# Patient Record
Sex: FEMALE | Employment: UNEMPLOYED | URBAN - METROPOLITAN AREA
[De-identification: names, ages, dates, MRNs, and addresses within clinical notes are randomized per-mention and may not be internally consistent; named-entity substitution may affect disease eponyms.]

---

## 2017-06-03 LAB
HBV SURFACE AG SERPL QL IA: NEGATIVE
HIV 1+2 AB+HIV1 P24 AG SERPL QL IA: NONREACTIVE
RUBELLA ANTIBODY IGG QUANTITATIVE: NORMAL IU/ML
T PALLIDUM IGG SER QL: NON REACTIVE

## 2017-06-06 ENCOUNTER — TRANSFERRED RECORDS (OUTPATIENT)
Dept: HEALTH INFORMATION MANAGEMENT | Facility: CLINIC | Age: 37
End: 2017-06-06

## 2017-07-17 RX ORDER — SODIUM CHLORIDE, SODIUM LACTATE, POTASSIUM CHLORIDE, CALCIUM CHLORIDE 600; 310; 30; 20 MG/100ML; MG/100ML; MG/100ML; MG/100ML
INJECTION, SOLUTION INTRAVENOUS CONTINUOUS
Status: CANCELLED | OUTPATIENT
Start: 2017-07-17

## 2017-07-17 RX ORDER — CITRIC ACID/SODIUM CITRATE 334-500MG
30 SOLUTION, ORAL ORAL
Status: CANCELLED | OUTPATIENT
Start: 2017-07-17

## 2017-07-17 RX ORDER — CEFAZOLIN SODIUM 1 G/3ML
1 INJECTION, POWDER, FOR SOLUTION INTRAMUSCULAR; INTRAVENOUS
Status: CANCELLED | OUTPATIENT
Start: 2017-07-17

## 2017-07-17 RX ORDER — CEFAZOLIN SODIUM 2 G/100ML
2 INJECTION, SOLUTION INTRAVENOUS
Status: CANCELLED | OUTPATIENT
Start: 2017-07-17

## 2017-11-22 LAB — GROUP B STREP PCR: NEGATIVE

## 2017-11-26 ENCOUNTER — HOSPITAL ENCOUNTER (OUTPATIENT)
Facility: CLINIC | Age: 37
LOS: 1 days | Discharge: HOME | End: 2017-11-26
Attending: OBSTETRICS & GYNECOLOGY | Admitting: OBSTETRICS & GYNECOLOGY
Payer: COMMERCIAL

## 2017-11-26 VITALS — RESPIRATION RATE: 16 BRPM | TEMPERATURE: 97.7 F | DIASTOLIC BLOOD PRESSURE: 66 MMHG | SYSTOLIC BLOOD PRESSURE: 103 MMHG

## 2017-11-26 PROBLEM — Z36.89 ENCOUNTER FOR TRIAGE IN PREGNANT PATIENT: Status: ACTIVE | Noted: 2017-11-26

## 2017-11-26 LAB
ALBUMIN UR-MCNC: 30 MG/DL
APPEARANCE UR: ABNORMAL
BILIRUB UR QL STRIP: NEGATIVE
COLOR UR AUTO: ABNORMAL
GLUCOSE BLDC GLUCOMTR-MCNC: 92 MG/DL (ref 70–99)
GLUCOSE UR STRIP-MCNC: NEGATIVE MG/DL
HGB UR QL STRIP: NEGATIVE
KETONES UR STRIP-MCNC: 5 MG/DL
LEUKOCYTE ESTERASE UR QL STRIP: NEGATIVE
MUCOUS THREADS #/AREA URNS LPF: PRESENT /LPF
NITRATE UR QL: NEGATIVE
PH UR STRIP: 5 PH (ref 5–7)
RBC #/AREA URNS AUTO: 2 /HPF (ref 0–2)
SOURCE: ABNORMAL
SP GR UR STRIP: 1.03 (ref 1–1.03)
SQUAMOUS #/AREA URNS AUTO: 1 /HPF (ref 0–1)
UROBILINOGEN UR STRIP-MCNC: 0 MG/DL (ref 0–2)
WBC #/AREA URNS AUTO: 1 /HPF (ref 0–2)

## 2017-11-26 PROCEDURE — 82962 GLUCOSE BLOOD TEST: CPT

## 2017-11-26 PROCEDURE — 96365 THER/PROPH/DIAG IV INF INIT: CPT

## 2017-11-26 PROCEDURE — 25000128 H RX IP 250 OP 636: Performed by: OBSTETRICS & GYNECOLOGY

## 2017-11-26 PROCEDURE — 96374 THER/PROPH/DIAG INJ IV PUSH: CPT

## 2017-11-26 PROCEDURE — 81001 URINALYSIS AUTO W/SCOPE: CPT | Performed by: OBSTETRICS & GYNECOLOGY

## 2017-11-26 PROCEDURE — 96361 HYDRATE IV INFUSION ADD-ON: CPT

## 2017-11-26 PROCEDURE — 99214 OFFICE O/P EST MOD 30 MIN: CPT

## 2017-11-26 RX ORDER — ONDANSETRON 2 MG/ML
4 INJECTION INTRAMUSCULAR; INTRAVENOUS EVERY 6 HOURS PRN
Status: DISCONTINUED | OUTPATIENT
Start: 2017-11-26 | End: 2017-11-27 | Stop reason: HOSPADM

## 2017-11-26 RX ADMIN — SODIUM CHLORIDE, POTASSIUM CHLORIDE, SODIUM LACTATE AND CALCIUM CHLORIDE 1000 ML: 600; 310; 30; 20 INJECTION, SOLUTION INTRAVENOUS at 21:02

## 2017-11-26 RX ADMIN — ONDANSETRON 4 MG: 2 INJECTION INTRAMUSCULAR; INTRAVENOUS at 21:06

## 2017-11-26 NOTE — IP AVS SNAPSHOT
[facility] Labor and Delivery    [address]    [facility] [address]    Phone:  [phone]    Fax:  [phone]                                       After Visit Summary   11/26/2017    Maggy Adams    MRN: [id number]           After Visit Summary Signature Page     I have received my discharge instructions, and my questions have been answered. I have discussed any challenges I see with this plan with the nurse or doctor.    ..........................................................................................................................................  Patient/Patient Representative Signature      ..........................................................................................................................................  Patient Representative Print Name and Relationship to Patient    ..................................................               ................................................  Date                                            Time    ..........................................................................................................................................  Reviewed by Signature/Title    ...................................................              ..............................................  Date                                                            Time

## 2017-11-26 NOTE — IP AVS SNAPSHOT
MRN:[id number]                      After Visit Summary   11/26/2017    Maggy Adams    MRN: [id number]           Thank you!     Thank you for choosing [facility] for your care. Our goal is always to provide you with excellent care. Hearing back from our patients is one way we can continue to improve our services. Please take a few minutes to complete the written survey that you may receive in the mail after you visit. If you would like to speak to someone directly about your visit please contact Patient Relations at [phone]. Thank you!          Patient Information     Date Of Birth          1980        About your hospital stay     You were admitted on:  November 26, 2017 You last received care in the:  [facility]    You were discharged on:  November 26, 2017       Who to Call     For medical emergencies, please call 911.  For non-urgent questions about your medical care, please call your primary care provider or clinic, None          Attending Provider     Provider Specialty    Cici Escobar MD OB/Gyn       Primary Care Provider    None Specified      Your next 10 appointments already scheduled     Dec 19, 2017   Procedure with Tonya Navarro MD   [facility] Delivery (--)    [address] [place] [id number] [phone]              Further instructions from your care team       Discharge Instruction for Undelivered Patients      You were seen for: nausea and vomiting  We Consulted: Dr Escobar  You had (Test or Medicine):fetal assessment, IV fluids and zofran for nausea     Diet:   Drink 8 to 12 glasses of liquids (milk, juice, water) every day.  You may eat meals and snacks.  To manager your diabetes, follow the guidelines for eating and drinking given to you by your Clinic Provider or Diabetes Educator.       Activity:  Count fetal kicks everyday (see handout)  Call your doctor or nurse midwife if your baby is moving  "less than usual.     Call your provider if you notice:  Swelling in your face or increased swelling in your hands or legs.  Headaches that are not relieved by Tylenol (acetaminophen).  Changes in your vision (blurring: seeing spots or stars.)  Nausea (sick to your stomach) and vomiting (throwing up).   Weight gain of 5 pounds or more per week.  Heartburn that doesn't go away.  Signs of bladder infection: pain when you urinate (use the toilet), need to go more often and more urgently.  The bag of puente (rupture of membranes) breaks, or you notice leaking in your underwear.  Bright red blood in your underwear.  Abdominal (lower belly) or stomach pain.  For first baby: Contractions (tightening) less than 5 minutes apart for one hour or more.  Second (plus) baby: Contractions (tightening) less than 10 minutes apart and getting stronger.  Increase or change in vaginal discharge (note the color and amount)    Follow-up:  As scheduled in the clinic          Pending Results     No orders found from 2017 to 2017.            Admission Information     Date & Time Provider Department Dept. Phone    2017 Cici Escobar MD [facility] Labor and Delivery [phone]      Your Vitals Were     Blood Pressure Temperature Respirations             103/66 97.7  F (36.5  C) (Oral) 16         MyChart Information     [employer] lets you send messages to your doctor, view your test results, renew your prescriptions, schedule appointments and more. To sign up, go to [email or web address] . Click on \"Log in\" on the left side of the screen, which will take you to the Welcome page. Then click on \"Sign up Now\" on the right side of the page.     You will be asked to enter the access code listed below, as well as some personal information. Please follow the directions to create your username and password.     Your access code is: [email or web address]  Expires: 2018 10:07 PM     Your access code will  in 90 days. If you " need help or a new code, please call your [facility] clinic or [phone].        Care EveryWhere ID     This is your Care EveryWhere ID. This could be used by other organizations to access your [facility] medical records  [id number]        Equal Access to Services     OCHOA PATEL : Hadii aad ku hadmelajm Soraquelali, waaxda luqadaha, qaybta kaalmada adejohanayada, rosmery ramilain hayaadann sánchezjohana gomez ira blanc. So [facility] [phone].    ATENCIÓN: Si habla español, tiene a siegel disposición servicios gratuitos de asistencia lingüística. Llame al [phone].    We comply with applicable federal civil rights laws and [place] laws. We do not discriminate on the basis of race, color, national origin, age, disability, sex, sexual orientation, or gender identity.               Review of your medicines      UNREVIEWED medicines. Ask your doctor about these medicines        Dose / Directions    insulin  UNIT/ML injection   Commonly known as:  HumuLIN N/NovoLIN N   Indication:  gestational diabetes        Dose:  12 Units   Inject 12 Units Subcutaneous At Bedtime   Refills:  0                Protect others around you: Learn how to safely use, store and throw away your medicines at www.disposemymeds.org.             Medication List: This is a list of all your medications and when to take them. Check marks below indicate your daily home schedule. Keep this list as a reference.      Medications           Morning Afternoon Evening Bedtime As Needed    insulin  UNIT/ML injection   Commonly known as:  HumuLIN N/NovoLIN N   Inject 12 Units Subcutaneous At Bedtime                                          More Information        Kick Counts  It s normal to worry about your baby s health. One way you can know your baby s doing well is to record the baby s movements once a day. This is called a kick count. You will usually feel your baby move by the 20th week of pregnancy. Remember to take your kick count records to all your  appointments with your healthcare provider.       How to Count Kicks    Choose a time when the baby is active, such as after a meal.     Sit comfortably or lie on your side.     The first time the baby moves, write down the time.     Count each movement until the baby has moved 10 times. This can take from 20 minutes to 2 hours.     If the baby hasn t moved 4 times in 1 hour, pat your stomach to wake the baby up.    Write down the time you feel the baby s 10th movement.    Try to do it at the same time each day.  When to Call Your Healthcare Provider  Call your healthcare provider right away if you notice any of the following:    Your baby moves fewer than 10 times in 2 hours while you re doing kick counts.    Your baby moves much less often than on the days before.    You have not felt your baby move all day.    7037-3840 [employer]. [address] [place] [address], [place] [address]. All rights reserved. This information is not intended as a substitute for professional medical care. Always follow your healthcare professional's instructions.  This information has been modified by your health care provider with permission from the publisher.

## 2017-11-27 NOTE — PLAN OF CARE
"Patient able to tolerate wallace crackers and water and states she feels \"much better.\"  Plan to discharge home with instructions per plan with Dr Escobar.  "

## 2017-11-27 NOTE — PLAN OF CARE
Data: [facility].   Reason for maternal/fetal assessment per patient is Nausea  . Patient is a . Prenatal record reviewed.      Obstetric History       T0      L2     SAB0   TAB0   Ectopic0   Multiple0   Live Births2       # Outcome Date GA Lbr Saul/2nd Weight Sex Delivery Anes PTL Lv   3 Current            2  05/29/15        ALEXX   1  13        ALEXX         Medical History:   Past Medical History:   Diagnosis Date     Diabetes (H)     gestational diabetic   . Gestational Age 36w5d. VSS. Cervix: not examined.  Fetal movement present. Patient denies backache, vaginal discharge, pelvic pressure, UTI symptoms, GI problems, bloody show, vaginal bleeding, edema, headache, visual disturbances, epigastric or URQ pain, abdominal pain, rupture of membranes. Support persons  andrea present.  Action: Verbal consent for EFM. Triage assessment completed. EFM applied for fetal assessment. Uterine assessment occasional cramping that has been present for multiple weeks. Fetal assessment: Presumed adequate fetal oxygenation documented (see flow record). Patient education pamphlets given on fetal movement counts. Patient instructed to report change in fetal movement, vaginal leaking of fluid or bleeding, abdominal pain, or any concerns related to the pregnancy to her nurse/physician.   Response: Dr. Escobar informed of status. Plan per provider is administer IV bolus and IV zofran.  If able to tolerate food may discharge home. Patient verbalized understanding of education and verbalized agreement with plan. Discharged ambulatory at 2210.    IV hydration for nausea.  IV start time:   IV stop time: 0  Face to Face time:

## 2017-11-27 NOTE — PROVIDER NOTIFICATION
"   17   Provider Notification   Provider Name/Title dr gan   Method of Notification Phone   Request Evaluate - Remote   Notification Reason Patient Arrived;Variability Change;Labor Status;Status Update     Dr gan notified of arrival, nausea and vomiting and mild dizziness since yesterday and head \"stuffiness\", difficulty keeping food down.  Patient's son had stomach flu yesterday.  36 5/7 weeks with scheduled  section for repeat scheduled for .  Occasional cramping noted that patient states she has had for many weeks.  Gestational diabetic on insulin at bedtime, blood sugar 92.  History of hyperemesis in beginning of pregnancy that has resolved now    Plan to obtain UA, give bolus of LR and zofran 4 mg.  If patient is able to keep food down plan to discharge home.  If unable to keep food down call MD.  "

## 2017-11-27 NOTE — DISCHARGE INSTRUCTIONS
Discharge Instruction for Undelivered Patients      You were seen for: nausea and vomiting  We Consulted: Dr Escobar  You had (Test or Medicine):fetal assessment, IV fluids and zofran for nausea     Diet:   Drink 8 to 12 glasses of liquids (milk, juice, water) every day.  You may eat meals and snacks.  To manager your diabetes, follow the guidelines for eating and drinking given to you by your Clinic Provider or Diabetes Educator.       Activity:  Count fetal kicks everyday (see handout)  Call your doctor or nurse midwife if your baby is moving less than usual.     Call your provider if you notice:  Swelling in your face or increased swelling in your hands or legs.  Headaches that are not relieved by Tylenol (acetaminophen).  Changes in your vision (blurring: seeing spots or stars.)  Nausea (sick to your stomach) and vomiting (throwing up).   Weight gain of 5 pounds or more per week.  Heartburn that doesn't go away.  Signs of bladder infection: pain when you urinate (use the toilet), need to go more often and more urgently.  The bag of puente (rupture of membranes) breaks, or you notice leaking in your underwear.  Bright red blood in your underwear.  Abdominal (lower belly) or stomach pain.  For first baby: Contractions (tightening) less than 5 minutes apart for one hour or more.  Second (plus) baby: Contractions (tightening) less than 10 minutes apart and getting stronger.  Increase or change in vaginal discharge (note the color and amount)    Follow-up:  As scheduled in the clinic

## 2017-12-13 ENCOUNTER — HOSPITAL ENCOUNTER (OUTPATIENT)
Dept: LAB | Facility: CLINIC | Age: 37
Discharge: HOME | End: 2017-12-13
Attending: OBSTETRICS & GYNECOLOGY | Admitting: OBSTETRICS & GYNECOLOGY
Payer: COMMERCIAL

## 2017-12-13 ENCOUNTER — ANESTHESIA EVENT (OUTPATIENT)
Dept: OBGYN | Facility: CLINIC | Age: 37
End: 2017-12-13
Payer: COMMERCIAL

## 2017-12-13 DIAGNOSIS — Z01.812 PRE-OPERATIVE LABORATORY EXAMINATION: ICD-10-CM

## 2017-12-13 LAB
ABO + RH BLD: NORMAL
ABO + RH BLD: NORMAL
BLD GP AB SCN SERPL QL: NORMAL
BLOOD BANK CMNT PATIENT-IMP: NORMAL
CREAT SERPL-MCNC: 0.57 MG/DL (ref 0.52–1.04)
GFR SERPL CREATININE-BSD FRML MDRD: >90 ML/MIN/1.7M2
HGB BLD-MCNC: 10.6 G/DL (ref 11.7–15.7)
POTASSIUM SERPL-SCNC: 3.7 MMOL/L (ref 3.4–5.3)
SPECIMEN EXP DATE BLD: NORMAL
T PALLIDUM IGG+IGM SER QL: NEGATIVE

## 2017-12-13 PROCEDURE — 86850 RBC ANTIBODY SCREEN: CPT | Performed by: OBSTETRICS & GYNECOLOGY

## 2017-12-13 PROCEDURE — 85018 HEMOGLOBIN: CPT | Performed by: OBSTETRICS & GYNECOLOGY

## 2017-12-13 PROCEDURE — 86780 TREPONEMA PALLIDUM: CPT | Performed by: OBSTETRICS & GYNECOLOGY

## 2017-12-13 PROCEDURE — 86900 BLOOD TYPING SEROLOGIC ABO: CPT | Performed by: OBSTETRICS & GYNECOLOGY

## 2017-12-13 PROCEDURE — 86901 BLOOD TYPING SEROLOGIC RH(D): CPT | Performed by: OBSTETRICS & GYNECOLOGY

## 2017-12-13 PROCEDURE — 36415 COLL VENOUS BLD VENIPUNCTURE: CPT | Performed by: OBSTETRICS & GYNECOLOGY

## 2017-12-13 PROCEDURE — 84132 ASSAY OF SERUM POTASSIUM: CPT | Performed by: OBSTETRICS & GYNECOLOGY

## 2017-12-13 PROCEDURE — 82565 ASSAY OF CREATININE: CPT | Performed by: OBSTETRICS & GYNECOLOGY

## 2017-12-13 NOTE — PHARMACY-ADMISSION MEDICATION HISTORY
Medication reconciliation completed by pre-admitting(Nunu Alva).      Prior to Admission medications    Medication Sig Last Dose Taking? Auth Provider   insulin NPH (HUMULIN N/NOVOLIN N) 100 UNIT/ML injection Inject 12 Units Subcutaneous At Bedtime   Reported, Patient

## 2017-12-15 ENCOUNTER — HOSPITAL ENCOUNTER (INPATIENT)
Facility: CLINIC | Age: 37
LOS: 3 days | Discharge: HOME | End: 2017-12-18
Attending: OBSTETRICS & GYNECOLOGY | Admitting: OBSTETRICS & GYNECOLOGY
Payer: COMMERCIAL

## 2017-12-15 ENCOUNTER — ANESTHESIA (OUTPATIENT)
Dept: OBGYN | Facility: CLINIC | Age: 37
End: 2017-12-15
Payer: COMMERCIAL

## 2017-12-15 DIAGNOSIS — Z98.891 S/P REPEAT LOW TRANSVERSE C-SECTION: Primary | ICD-10-CM

## 2017-12-15 LAB
GLUCOSE BLDC GLUCOMTR-MCNC: 116 MG/DL (ref 70–99)
GLUCOSE BLDC GLUCOMTR-MCNC: 145 MG/DL (ref 70–99)
GLUCOSE BLDC GLUCOMTR-MCNC: 67 MG/DL (ref 70–99)
GLUCOSE BLDC GLUCOMTR-MCNC: 80 MG/DL (ref 70–99)
GLUCOSE BLDC GLUCOMTR-MCNC: 92 MG/DL (ref 70–99)

## 2017-12-15 PROCEDURE — 12000029 ZZH R&B OB INTERMEDIATE

## 2017-12-15 PROCEDURE — 36000056 ZZH SURGERY LEVEL 3 1ST 30 MIN: Performed by: OBSTETRICS & GYNECOLOGY

## 2017-12-15 PROCEDURE — 88302 TISSUE EXAM BY PATHOLOGIST: CPT | Performed by: OBSTETRICS & GYNECOLOGY

## 2017-12-15 PROCEDURE — 00000146 ZZHCL STATISTIC GLUCOSE BY METER IP

## 2017-12-15 PROCEDURE — 25000128 H RX IP 250 OP 636: Performed by: OBSTETRICS & GYNECOLOGY

## 2017-12-15 PROCEDURE — 25000132 ZZH RX MED GY IP 250 OP 250 PS 637: Performed by: OBSTETRICS & GYNECOLOGY

## 2017-12-15 PROCEDURE — 71000013 ZZH RECOVERY PHASE 1 LEVEL 1 EA ADDTL HR: Performed by: OBSTETRICS & GYNECOLOGY

## 2017-12-15 PROCEDURE — 0UN90ZZ RELEASE UTERUS, OPEN APPROACH: ICD-10-PCS | Performed by: OBSTETRICS & GYNECOLOGY

## 2017-12-15 PROCEDURE — 88307 TISSUE EXAM BY PATHOLOGIST: CPT | Mod: 26 | Performed by: OBSTETRICS & GYNECOLOGY

## 2017-12-15 PROCEDURE — 25000128 H RX IP 250 OP 636: Performed by: NURSE ANESTHETIST, CERTIFIED REGISTERED

## 2017-12-15 PROCEDURE — 37000008 ZZH ANESTHESIA TECHNICAL FEE, 1ST 30 MIN: Performed by: OBSTETRICS & GYNECOLOGY

## 2017-12-15 PROCEDURE — 25000128 H RX IP 250 OP 636: Performed by: ANESTHESIOLOGY

## 2017-12-15 PROCEDURE — 0UB70ZZ EXCISION OF BILATERAL FALLOPIAN TUBES, OPEN APPROACH: ICD-10-PCS | Performed by: OBSTETRICS & GYNECOLOGY

## 2017-12-15 PROCEDURE — 27210995 ZZH RX 272: Performed by: OBSTETRICS & GYNECOLOGY

## 2017-12-15 PROCEDURE — 25000125 ZZHC RX 250: Performed by: ANESTHESIOLOGY

## 2017-12-15 PROCEDURE — 25000125 ZZHC RX 250: Performed by: NURSE ANESTHETIST, CERTIFIED REGISTERED

## 2017-12-15 PROCEDURE — 88307 TISSUE EXAM BY PATHOLOGIST: CPT | Performed by: OBSTETRICS & GYNECOLOGY

## 2017-12-15 PROCEDURE — 88302 TISSUE EXAM BY PATHOLOGIST: CPT | Mod: 26 | Performed by: OBSTETRICS & GYNECOLOGY

## 2017-12-15 PROCEDURE — 71000012 ZZH RECOVERY PHASE 1 LEVEL 1 FIRST HR: Performed by: OBSTETRICS & GYNECOLOGY

## 2017-12-15 PROCEDURE — 36000058 ZZH SURGERY LEVEL 3 EA 15 ADDTL MIN: Performed by: OBSTETRICS & GYNECOLOGY

## 2017-12-15 PROCEDURE — 37000009 ZZH ANESTHESIA TECHNICAL FEE, EACH ADDTL 15 MIN: Performed by: OBSTETRICS & GYNECOLOGY

## 2017-12-15 PROCEDURE — 27210794 ZZH OR GENERAL SUPPLY STERILE: Performed by: OBSTETRICS & GYNECOLOGY

## 2017-12-15 PROCEDURE — 25000125 ZZHC RX 250: Performed by: OBSTETRICS & GYNECOLOGY

## 2017-12-15 RX ORDER — OXYTOCIN/0.9 % SODIUM CHLORIDE 30/500 ML
100 PLASTIC BAG, INJECTION (ML) INTRAVENOUS CONTINUOUS
Status: DISCONTINUED | OUTPATIENT
Start: 2017-12-15 | End: 2017-12-18 | Stop reason: HOSPADM

## 2017-12-15 RX ORDER — MAGNESIUM HYDROXIDE 1200 MG/15ML
LIQUID ORAL PRN
Status: DISCONTINUED | OUTPATIENT
Start: 2017-12-15 | End: 2017-12-18 | Stop reason: HOSPADM

## 2017-12-15 RX ORDER — MISOPROSTOL 200 UG/1
400 TABLET ORAL
Status: DISCONTINUED | OUTPATIENT
Start: 2017-12-15 | End: 2017-12-18 | Stop reason: HOSPADM

## 2017-12-15 RX ORDER — LANOLIN 100 %
OINTMENT (GRAM) TOPICAL
Status: DISCONTINUED | OUTPATIENT
Start: 2017-12-15 | End: 2017-12-18 | Stop reason: HOSPADM

## 2017-12-15 RX ORDER — BISACODYL 10 MG
10 SUPPOSITORY, RECTAL RECTAL DAILY PRN
Status: DISCONTINUED | OUTPATIENT
Start: 2017-12-17 | End: 2017-12-18 | Stop reason: HOSPADM

## 2017-12-15 RX ORDER — KETOROLAC TROMETHAMINE 30 MG/ML
30 INJECTION, SOLUTION INTRAMUSCULAR; INTRAVENOUS EVERY 6 HOURS
Status: COMPLETED | OUTPATIENT
Start: 2017-12-15 | End: 2017-12-16

## 2017-12-15 RX ORDER — CEFAZOLIN SODIUM 2 G/100ML
2 INJECTION, SOLUTION INTRAVENOUS
Status: COMPLETED | OUTPATIENT
Start: 2017-12-15 | End: 2017-12-15

## 2017-12-15 RX ORDER — MORPHINE SULFATE 1 MG/ML
INJECTION, SOLUTION EPIDURAL; INTRATHECAL; INTRAVENOUS PRN
Status: DISCONTINUED | OUTPATIENT
Start: 2017-12-15 | End: 2017-12-15

## 2017-12-15 RX ORDER — CITRIC ACID/SODIUM CITRATE 334-500MG
30 SOLUTION, ORAL ORAL
Status: COMPLETED | OUTPATIENT
Start: 2017-12-15 | End: 2017-12-15

## 2017-12-15 RX ORDER — LIDOCAINE 40 MG/G
CREAM TOPICAL
Status: DISCONTINUED | OUTPATIENT
Start: 2017-12-15 | End: 2017-12-18 | Stop reason: HOSPADM

## 2017-12-15 RX ORDER — OXYCODONE HYDROCHLORIDE 5 MG/1
5-10 TABLET ORAL
Status: DISCONTINUED | OUTPATIENT
Start: 2017-12-15 | End: 2017-12-18 | Stop reason: HOSPADM

## 2017-12-15 RX ORDER — ONDANSETRON 2 MG/ML
4 INJECTION INTRAMUSCULAR; INTRAVENOUS EVERY 30 MIN PRN
Status: DISCONTINUED | OUTPATIENT
Start: 2017-12-15 | End: 2017-12-15 | Stop reason: HOSPADM

## 2017-12-15 RX ORDER — SODIUM CHLORIDE, SODIUM LACTATE, POTASSIUM CHLORIDE, CALCIUM CHLORIDE 600; 310; 30; 20 MG/100ML; MG/100ML; MG/100ML; MG/100ML
INJECTION, SOLUTION INTRAVENOUS CONTINUOUS
Status: DISCONTINUED | OUTPATIENT
Start: 2017-12-15 | End: 2017-12-15 | Stop reason: HOSPADM

## 2017-12-15 RX ORDER — ACETAMINOPHEN 325 MG/1
650 TABLET ORAL EVERY 4 HOURS PRN
Status: DISCONTINUED | OUTPATIENT
Start: 2017-12-18 | End: 2017-12-18 | Stop reason: HOSPADM

## 2017-12-15 RX ORDER — SODIUM CHLORIDE, SODIUM LACTATE, POTASSIUM CHLORIDE, CALCIUM CHLORIDE 600; 310; 30; 20 MG/100ML; MG/100ML; MG/100ML; MG/100ML
INJECTION, SOLUTION INTRAVENOUS CONTINUOUS
Status: DISCONTINUED | OUTPATIENT
Start: 2017-12-15 | End: 2017-12-15

## 2017-12-15 RX ORDER — BUPIVACAINE HYDROCHLORIDE 7.5 MG/ML
INJECTION, SOLUTION INTRASPINAL PRN
Status: DISCONTINUED | OUTPATIENT
Start: 2017-12-15 | End: 2017-12-15

## 2017-12-15 RX ORDER — IBUPROFEN 400 MG/1
400-800 TABLET, FILM COATED ORAL EVERY 6 HOURS PRN
Status: DISCONTINUED | OUTPATIENT
Start: 2017-12-16 | End: 2017-12-18 | Stop reason: HOSPADM

## 2017-12-15 RX ORDER — OXYTOCIN/0.9 % SODIUM CHLORIDE 30/500 ML
PLASTIC BAG, INJECTION (ML) INTRAVENOUS CONTINUOUS PRN
Status: DISCONTINUED | OUTPATIENT
Start: 2017-12-15 | End: 2017-12-15

## 2017-12-15 RX ORDER — NALOXONE HYDROCHLORIDE 0.4 MG/ML
.1-.4 INJECTION, SOLUTION INTRAMUSCULAR; INTRAVENOUS; SUBCUTANEOUS
Status: DISCONTINUED | OUTPATIENT
Start: 2017-12-15 | End: 2017-12-18 | Stop reason: HOSPADM

## 2017-12-15 RX ORDER — ONDANSETRON 4 MG/1
4 TABLET, ORALLY DISINTEGRATING ORAL EVERY 30 MIN PRN
Status: DISCONTINUED | OUTPATIENT
Start: 2017-12-15 | End: 2017-12-15 | Stop reason: HOSPADM

## 2017-12-15 RX ORDER — ACETAMINOPHEN 325 MG/1
975 TABLET ORAL EVERY 8 HOURS
Status: COMPLETED | OUTPATIENT
Start: 2017-12-15 | End: 2017-12-18

## 2017-12-15 RX ORDER — NALBUPHINE HYDROCHLORIDE 10 MG/ML
5 INJECTION, SOLUTION INTRAMUSCULAR; INTRAVENOUS; SUBCUTANEOUS
Status: DISPENSED | OUTPATIENT
Start: 2017-12-15 | End: 2017-12-17

## 2017-12-15 RX ORDER — HYDROMORPHONE HYDROCHLORIDE 1 MG/ML
.3-.5 INJECTION, SOLUTION INTRAMUSCULAR; INTRAVENOUS; SUBCUTANEOUS EVERY 30 MIN PRN
Status: DISCONTINUED | OUTPATIENT
Start: 2017-12-15 | End: 2017-12-18 | Stop reason: HOSPADM

## 2017-12-15 RX ORDER — NICOTINE POLACRILEX 4 MG
15-30 LOZENGE BUCCAL
Status: DISCONTINUED | OUTPATIENT
Start: 2017-12-15 | End: 2017-12-18 | Stop reason: HOSPADM

## 2017-12-15 RX ORDER — FENTANYL CITRATE 50 UG/ML
25-50 INJECTION, SOLUTION INTRAMUSCULAR; INTRAVENOUS
Status: DISCONTINUED | OUTPATIENT
Start: 2017-12-15 | End: 2017-12-15 | Stop reason: HOSPADM

## 2017-12-15 RX ORDER — HYDROCORTISONE 2.5 %
CREAM (GRAM) TOPICAL 3 TIMES DAILY PRN
Status: DISCONTINUED | OUTPATIENT
Start: 2017-12-15 | End: 2017-12-18 | Stop reason: HOSPADM

## 2017-12-15 RX ORDER — DIPHENHYDRAMINE HYDROCHLORIDE 50 MG/ML
25 INJECTION INTRAMUSCULAR; INTRAVENOUS EVERY 6 HOURS PRN
Status: DISCONTINUED | OUTPATIENT
Start: 2017-12-15 | End: 2017-12-18 | Stop reason: HOSPADM

## 2017-12-15 RX ORDER — SIMETHICONE 80 MG
80 TABLET,CHEWABLE ORAL 4 TIMES DAILY PRN
Status: DISCONTINUED | OUTPATIENT
Start: 2017-12-15 | End: 2017-12-18 | Stop reason: HOSPADM

## 2017-12-15 RX ORDER — NALOXONE HYDROCHLORIDE 0.4 MG/ML
.1-.4 INJECTION, SOLUTION INTRAMUSCULAR; INTRAVENOUS; SUBCUTANEOUS
Status: ACTIVE | OUTPATIENT
Start: 2017-12-15 | End: 2017-12-16

## 2017-12-15 RX ORDER — AMOXICILLIN 250 MG
1-2 CAPSULE ORAL 2 TIMES DAILY
Status: DISCONTINUED | OUTPATIENT
Start: 2017-12-15 | End: 2017-12-18 | Stop reason: HOSPADM

## 2017-12-15 RX ORDER — DEXTROSE, SODIUM CHLORIDE, SODIUM LACTATE, POTASSIUM CHLORIDE, AND CALCIUM CHLORIDE 5; .6; .31; .03; .02 G/100ML; G/100ML; G/100ML; G/100ML; G/100ML
INJECTION, SOLUTION INTRAVENOUS CONTINUOUS
Status: DISCONTINUED | OUTPATIENT
Start: 2017-12-15 | End: 2017-12-18 | Stop reason: HOSPADM

## 2017-12-15 RX ORDER — OXYTOCIN/0.9 % SODIUM CHLORIDE 30/500 ML
340 PLASTIC BAG, INJECTION (ML) INTRAVENOUS CONTINUOUS PRN
Status: DISCONTINUED | OUTPATIENT
Start: 2017-12-15 | End: 2017-12-18 | Stop reason: HOSPADM

## 2017-12-15 RX ORDER — ONDANSETRON 2 MG/ML
INJECTION INTRAMUSCULAR; INTRAVENOUS PRN
Status: DISCONTINUED | OUTPATIENT
Start: 2017-12-15 | End: 2017-12-15

## 2017-12-15 RX ORDER — HYDROMORPHONE HYDROCHLORIDE 1 MG/ML
.3-.5 INJECTION, SOLUTION INTRAMUSCULAR; INTRAVENOUS; SUBCUTANEOUS EVERY 5 MIN PRN
Status: DISCONTINUED | OUTPATIENT
Start: 2017-12-15 | End: 2017-12-15 | Stop reason: HOSPADM

## 2017-12-15 RX ORDER — ONDANSETRON 2 MG/ML
4 INJECTION INTRAMUSCULAR; INTRAVENOUS EVERY 6 HOURS PRN
Status: DISCONTINUED | OUTPATIENT
Start: 2017-12-15 | End: 2017-12-18 | Stop reason: HOSPADM

## 2017-12-15 RX ORDER — DEXTROSE MONOHYDRATE 25 G/50ML
25-50 INJECTION, SOLUTION INTRAVENOUS
Status: DISCONTINUED | OUTPATIENT
Start: 2017-12-15 | End: 2017-12-18 | Stop reason: HOSPADM

## 2017-12-15 RX ORDER — EPHEDRINE SULFATE 50 MG/ML
INJECTION, SOLUTION INTRAVENOUS PRN
Status: DISCONTINUED | OUTPATIENT
Start: 2017-12-15 | End: 2017-12-15

## 2017-12-15 RX ORDER — OXYTOCIN 10 [USP'U]/ML
10 INJECTION, SOLUTION INTRAMUSCULAR; INTRAVENOUS
Status: DISCONTINUED | OUTPATIENT
Start: 2017-12-15 | End: 2017-12-18 | Stop reason: HOSPADM

## 2017-12-15 RX ORDER — DIPHENHYDRAMINE HCL 25 MG
25 CAPSULE ORAL EVERY 6 HOURS PRN
Status: DISCONTINUED | OUTPATIENT
Start: 2017-12-15 | End: 2017-12-18 | Stop reason: HOSPADM

## 2017-12-15 RX ORDER — CEFAZOLIN SODIUM 1 G/3ML
1 INJECTION, POWDER, FOR SOLUTION INTRAMUSCULAR; INTRAVENOUS SEE ADMIN INSTRUCTIONS
Status: DISCONTINUED | OUTPATIENT
Start: 2017-12-15 | End: 2017-12-15

## 2017-12-15 RX ADMIN — SODIUM CHLORIDE, POTASSIUM CHLORIDE, SODIUM LACTATE AND CALCIUM CHLORIDE: 600; 310; 30; 20 INJECTION, SOLUTION INTRAVENOUS at 06:59

## 2017-12-15 RX ADMIN — EPHEDRINE SULFATE 5 MG: 50 INJECTION, SOLUTION INTRAVENOUS at 07:12

## 2017-12-15 RX ADMIN — SENNOSIDES AND DOCUSATE SODIUM 1 TABLET: 8.6; 5 TABLET ORAL at 20:48

## 2017-12-15 RX ADMIN — SODIUM CHLORIDE, POTASSIUM CHLORIDE, SODIUM LACTATE AND CALCIUM CHLORIDE: 600; 310; 30; 20 INJECTION, SOLUTION INTRAVENOUS at 06:33

## 2017-12-15 RX ADMIN — KETOROLAC TROMETHAMINE 30 MG: 30 INJECTION, SOLUTION INTRAMUSCULAR at 09:18

## 2017-12-15 RX ADMIN — NALBUPHINE HYDROCHLORIDE 5 MG: 10 INJECTION, SOLUTION INTRAMUSCULAR; INTRAVENOUS; SUBCUTANEOUS at 13:33

## 2017-12-15 RX ADMIN — ACETAMINOPHEN 975 MG: 325 TABLET, FILM COATED ORAL at 09:21

## 2017-12-15 RX ADMIN — BUPIVACAINE HYDROCHLORIDE IN DEXTROSE 13 MG: 7.5 INJECTION, SOLUTION SUBARACHNOID at 07:09

## 2017-12-15 RX ADMIN — CEFAZOLIN SODIUM 2 G: 2 INJECTION, SOLUTION INTRAVENOUS at 07:13

## 2017-12-15 RX ADMIN — ACETAMINOPHEN 975 MG: 325 TABLET, FILM COATED ORAL at 16:28

## 2017-12-15 RX ADMIN — SODIUM CHLORIDE, POTASSIUM CHLORIDE, SODIUM LACTATE AND CALCIUM CHLORIDE: 600; 310; 30; 20 INJECTION, SOLUTION INTRAVENOUS at 06:07

## 2017-12-15 RX ADMIN — EPHEDRINE SULFATE 10 MG: 50 INJECTION, SOLUTION INTRAVENOUS at 07:15

## 2017-12-15 RX ADMIN — NALBUPHINE HYDROCHLORIDE 5 MG: 10 INJECTION, SOLUTION INTRAMUSCULAR; INTRAVENOUS; SUBCUTANEOUS at 18:15

## 2017-12-15 RX ADMIN — ONDANSETRON 4 MG: 2 INJECTION INTRAMUSCULAR; INTRAVENOUS at 07:36

## 2017-12-15 RX ADMIN — OXYTOCIN-SODIUM CHLORIDE 0.9% IV SOLN 30 UNIT/500ML 350 ML/HR: 30-0.9/5 SOLUTION at 07:31

## 2017-12-15 RX ADMIN — MORPHINE SULFATE 0.25 MG: 1 INJECTION EPIDURAL; INTRATHECAL; INTRAVENOUS at 07:09

## 2017-12-15 RX ADMIN — KETOROLAC TROMETHAMINE 30 MG: 30 INJECTION, SOLUTION INTRAMUSCULAR at 14:52

## 2017-12-15 RX ADMIN — SODIUM CITRATE AND CITRIC ACID MONOHYDRATE 30 ML: 500; 334 SOLUTION ORAL at 06:31

## 2017-12-15 RX ADMIN — EPHEDRINE SULFATE 5 MG: 50 INJECTION, SOLUTION INTRAVENOUS at 07:24

## 2017-12-15 RX ADMIN — DIPHENHYDRAMINE HYDROCHLORIDE 25 MG: 25 CAPSULE ORAL at 10:27

## 2017-12-15 RX ADMIN — SODIUM CHLORIDE, SODIUM LACTATE, POTASSIUM CHLORIDE, CALCIUM CHLORIDE AND DEXTROSE MONOHYDRATE: 5; 600; 310; 30; 20 INJECTION, SOLUTION INTRAVENOUS at 16:14

## 2017-12-15 RX ADMIN — KETOROLAC TROMETHAMINE 30 MG: 30 INJECTION, SOLUTION INTRAMUSCULAR at 20:48

## 2017-12-15 RX ADMIN — OXYTOCIN-SODIUM CHLORIDE 0.9% IV SOLN 30 UNIT/500ML 100 ML/HR: 30-0.9/5 SOLUTION at 10:09

## 2017-12-15 ASSESSMENT — LIFESTYLE VARIABLES: TOBACCO_USE: 0

## 2017-12-15 ASSESSMENT — ENCOUNTER SYMPTOMS: DYSRHYTHMIAS: 0

## 2017-12-15 NOTE — OP NOTE
DATE OF PROCEDURE:  12/15/2017       PREOPERATIVE DIAGNOSES:   1.  Prior  section.   2.  Desiring sterilization.      POSTOPERATIVE DIAGNOSES:   1.  Prior  section.     2.  Desiring sterilization.   3.  Significant lysis of adhesions.   4.  Tissue dystocia.      PROCEDURE:   1.  Repeat low transverse  section.   2.  Bilateral tubal ligation.   3.  Lysis of adhesions.   4.  Vacuum-assisted delivery of the fetal vertex.      SURGEON:  Tonya Navarro MD      ASSISTANT:  Rena Alexandra CST, [facility]      ANESTHETIC:  Spinal.      ESTIMATED BLOOD:  500 mL.       SPECIMENS:     1.  Cord blood.   2.  Cord gas.   3.  Placenta.   4.  Portions of bilateral fallopian tubes.      FINDINGS:  Dense adhesions of the anterior abdominal wall to the anterior uterus.  Normal tubes and ovaries were noted, and tissue dystocia for the delivery of the fetal head.      COMPLICATIONS:  Included tissue dystocia and adhesions.      INDICATIONS:  Maggy Adams is a 37-year-old G3, P2-0-2-2 at 39-3/7 weeks, who has had a pregnancy that has been complicated by gestational diabetes requiring insulin.  She has had sugars that have been well controlled.  She underwent routine prenatal care and was scheduled for repeat  at 39 weeks.  She was offered, once insulin dependence was diagnosed, to transition this to 38-39 weeks, and she declined.  Risks, benefits and alternatives of the procedure were discussed.  We also did discuss risks, benefits and alternatives of tubal ligation, and she did sign an informed consent.      DESCRIPTION OF PROCEDURE:  The patient was brought to the operating room, where spinal anesthetic was placed.  She was prepped and draped in the normal sterile fashion.  A Chapa catheter was placed, and a pause for the cause was performed.  The patient and procedure were correctly identified.  The spinal was found to be an adequate anesthetic.  At this point, a low transverse skin incision was made; it was  carried down to the underlying fascia, which was scored with Bovie electrocautery, stretched bilaterally, and extended with a bandage scissors.  The fascial layers were grasped with 2 Kocher clamps anterior and inferior, and these were then dissected away from the underlying rectus muscles.  There was very tight tissue; however, no further extension was felt to be warranted.  The peritoneal cavity was tented up with Butte clamps and entered sharply with the Onekama scissors.  There was found to be dense adhesions of the bladder and the anterior abdominal wall to the anterior uterus.  These were taken down meticulously in layers with pickups and Onekama, and an operative window was found.  At this point, a hysterotomy was begun, and the cavity was entered bluntly and finger fractured bilaterally.  This was extended to the best of our ability.        Fetal vertex was attempted to be delivered, and after unsuccessful attempts for approximately 1-1/2 minutes, decision was made to apply the Kiwi vacuum extractor.  Verbal consent was obtained from the parents, and a Kiwi vacuum extractor was placed 2 cm anterior the posterior fontanelle, placed in the green zone.  There was 1 pop off and 2 pulls.  Total accrued time was approximately 30 seconds.  The fetal vertex then delivered atraumatically, and delayed cord clamp of 1 minute was performed.  The infant was squalling and was handed to the nursery staff.  At this point, cord blood was collected as well as cord gases.  Placenta delivered spontaneously.  The uterine cavity was wiped of all placental membrane fragments.  The hysterotomy was closed with a running locked suture of 0 Monocryl and a second horizontal imbricating suture of 0 Monocryl.        At this point, attention was placed to the uterus, where decision was made if we could proceed with tubal ligation.  There was felt to be a possible access point on the left anterior portion of the uterus.  This was tented up with  2 Brenda clamps and entered sharply.  We then did see a nice clear peritoneal surface, and at this point, this was extended to the right side with a finger into this window.  Cautery was utilized to transect the dense adhesion tissue.  Due to the tissue dystocia and dense adhesions, the uterus was rotated both directions, again identifying the fallopian tubes bilaterally and walking out to the fimbria.  A portion of the fallopian tube was tented with a Addi.  A knuckle was tied off with an 0 Monocryl, and then both sides of the knuckle were tied off with 0 Monocryl again, and a portion of the tube was excised.  This was done bilaterally.  The tubal ostia were visualized and found to be hemostatic.  The uterus remained in the abdomen due to significant scarring, and the pericolic gutters were irrigated with moist laps.  All underlying tissue layers required Bovie electrocautery for hemostasis due to the excessive lysis of adhesions.  All tissue layers were found to be hemostatic at the end of the procedure, and the fascial layer was closed with a running locked suture of 0 Vicryl.  This was found to be without palpable defect.  The subcutaneous tissue was irrigated with a moist lap. The skin was reapproximated with Insorb staples and dressed with a Tegaderm dressing.  The patient tolerated the procedure well.  All counts were correct.  Debrief was performed.  She will be transferred to PACU in stable condition.         MARISELA GARCIA MD             D: 12/15/2017 08:12   T: 12/15/2017 09:37   MT: [id number]      Name:     ZOLTAN MCKINNEY   MRN:      [id number]        Account:        [id number]   :      1980           Procedure Date: 12/15/2017      Document: [id number]

## 2017-12-15 NOTE — PLAN OF CARE
Data: Maggy Adams transferred to [facility] via cart at 1050. Baby transferred via mother's arms  Action: Receiving unit notified of transfer: Yes. Patient and family notified of room change. Report given to Annetta MYERS RN at 1100 Belongings sent to receiving unit. Accompanied by Registered Nurse. Oriented patient to surroundings. Call light within reach. ID bands double-checked with another RN Vane.  Response: Patient tolerated transfer and is stable.

## 2017-12-15 NOTE — PLAN OF CARE
Problem: Patient Care Overview  Goal: Plan of Care/Patient Progress Review  Outcome: No Change  General orientation and safety education completed with pt. VSS. Postpartum assessments WDL - see flowsheet. PCD's on lower legs bilaterally. Second bag of pit running through IV. Pt tolerating a full liquid at this time. Chapa in place, draining adequate amount of urine at this time. Pt denies pain and declines need for additional pain medications. Toradol given as scheduled - see MAR. Taking benedryl and nubain for pruritis - see MAR. Incision WDL -Tegederm over incision with moist sanguinous drainage. Verbalized possibly wanting an early DC. Continue to monitor.

## 2017-12-15 NOTE — IP AVS SNAPSHOT
[facility]    [address] [facility] [address]    Phone:  [phone]    Fax:  [phone]                                       After Visit Summary   12/15/2017    Maggy Adams    MRN: [id number]           After Visit Summary Signature Page     I have received my discharge instructions, and my questions have been answered. I have discussed any challenges I see with this plan with the nurse or doctor.    ..........................................................................................................................................  Patient/Patient Representative Signature      ..........................................................................................................................................  Patient Representative Print Name and Relationship to Patient    ..................................................               ................................................  Date                                            Time    ..........................................................................................................................................  Reviewed by Signature/Title    ...................................................              ..............................................  Date                                                            Time

## 2017-12-15 NOTE — ANESTHESIA POSTPROCEDURE EVALUATION
Patient: Maggy Adams    Procedure(s):  Repeat  section with bilateral tubal ligation - Wound Class: II-Clean Contaminated    Diagnosis:Previous  Section   Diagnosis Additional Information: Pre-operative diagnosis: Previous  Section   Desired sterilization  Post-operative diagnosis Same  FRANCHESCA  Tissue dystocia  Procedure: Procedure(s):  COMBINED  SECTION, TUBAL LIGATION  Lysis of adhesions  Vacuum assisted  section,   Surgeon: Tonya Navarro MD        Anesthesia Type:  Spinal    Note:  Anesthesia Post Evaluation    Patient location during evaluation: Floor  Patient participation: Able to participate in evaluation but full recovery from regional anesthesia has not yet ocurrred but is anticipated to occur within 48 hours  Level of consciousness: awake  Pain management: adequate  Airway patency: patent  Cardiovascular status: acceptable  Respiratory status: acceptable  Hydration status: euvolemic  PONV: controlled     Anesthetic complications: None          Last vitals:  Vitals:    12/15/17 0550 12/15/17 0810   BP: 106/65 97/51   Resp:  14   Temp: 97.7  F (36.5  C) 97.9  F (36.6  C)         Electronically Signed By: Hayder Rivera MD  December 15, 2017  8:32 AM

## 2017-12-15 NOTE — ANESTHESIA CARE TRANSFER NOTE
Patient: Maggy Adams    Procedure(s):  Repeat  section with bilateral tubal ligation - Wound Class: II-Clean Contaminated    Diagnosis: Previous  Section   Diagnosis Additional Information: No value filed.    Anesthesia Type:   Spinal     Note:  Airway :Face Mask  Patient transferred to:PACU  Comments: Pt VSS, comfortable, tx to mac room 2 on L/D, monitors on and report to RNHandoff Report: Identifed the Patient, Identified the Reponsible Provider, Reviewed the pertinent medical history, Discussed the surgical course, Reviewed Intra-OP anesthesia mangement and issues during anesthesia, Set expectations for post-procedure period and Allowed opportunity for questions and acknowledgement of understanding      Vitals: (Last set prior to Anesthesia Care Transfer)    CRNA VITALS  12/15/2017 0735 - 12/15/2017 0808      12/15/2017             Pulse: 78    SpO2: 98 %                Electronically Signed By: JACOBO Pizarro CRNA  December 15, 2017  8:08 AM

## 2017-12-15 NOTE — ANESTHESIA PREPROCEDURE EVALUATION
PAC NOTE:       ANESTHESIA PRE EVALUATION:  Anesthesia Evaluation     . Pt has had prior anesthetic. Type: Regional    No history of anesthetic complications          ROS/MED HX    ENT/Pulmonary:      (-) tobacco use, sleep apnea, AMARILYS risk factors and Other pulmonary disease   Neurologic:      (-) TIA, Other neuro hx, Dementia and Neuropathy   Cardiovascular:     (+) hypertension----. : . . . :. .      (-) CAD, CHF, arrhythmias, pulmonary hypertension and dyslipidemia   METS/Exercise Tolerance:     Hematologic:        (-) anemia   Musculoskeletal:        (-) arthritis   GI/Hepatic:     (+) GERD      (-) hiatal hernia and hepatitis   Renal/Genitourinary:      (-) renal disease   Endo:     (+) type I DM (Gestational - insulin requiring NPH 12 at HS), .   (-) thyroid disease, chronic steroid usage, other endocrine disorder and obesity   Psychiatric:        (-) psychiatric history   Infectious Disease:  - neg infectious disease ROS       Malignancy:      - no malignancy   Other:    - neg other ROS                 Physical Exam      Airway   Mallampati: II  TM distance: >3 FB  Neck ROM: full    Dental     Cardiovascular   Rhythm and rate: regular and normal  (-) no murmur    Pulmonary    breath sounds clear to auscultation    Other findings: Lab Test        12/13/17                       0938          HGB          10.6*          Lab Test        12/13/17                       0938          POTASSIUM    3.7           CR           0.57                 Anesthesia Plan      History & Physical Review  History and physical reviewed and following examination; no interval change.    ASA Status:  2 .    NPO Status:  > 8 hours    Plan for Spinal   PONV prophylaxis:  Ondansetron (or other 5HT-3)       Postoperative Care  Postoperative pain management:  IV analgesics and Oral pain medications.      Consents  Anesthetic plan, risks, benefits and alternatives discussed with:  Patient.  Use of blood products discussed: Yes.   Use of  blood products discussed with Patient.  Consented to blood products.  .                            .

## 2017-12-15 NOTE — ANESTHESIA PROCEDURE NOTES
Peripheral nerve/Neuraxial procedure note : intrathecal  Pre-Procedure  Performed by ROSALINE VOGT  Referred by RADHA  Location: OR      Pre-Anesthestic Checklist: patient identified, IV checked, risks and benefits discussed, informed consent, monitors and equipment checked, pre-op evaluation and at physician/surgeon's request    Timeout  Correct Patient: Yes   Correct Procedure: Yes   Correct Site: Yes   Correct Laterality: Yes   Correct Position: Yes   Site Marked: Yes   .   Procedure Documentation  ASA 2  .    Procedure:    Intrathecal.   (midline approach)      Patient Prep;mask, sterile gloves, povidone-iodine 7.5% surgical scrub.  .  Needle: Faye tip Spinal Needle (gauge): 25  Spinal/LP Needle Length (inches): 3 # of attempts: 1 and # of redirects:  Introducer used Introducer: 20 G .       Assessment/Narrative  Paresthesias: No.  .  .  0.2 mL of clear CSF fluid removed .

## 2017-12-15 NOTE — BRIEF OP NOTE
[facility]  Brief Operative Note    Pre-operative diagnosis: Previous  Section   Desired sterilization   Post-operative diagnosis Same  FRANCHESCA  Tissue dystocia   Procedure: Procedure(s):  COMBINED  SECTION, TUBAL LIGATION  Lysis of adhesions  Vacuum assisted  section   Surgeon: Tonya Navarro MD   Assistants(s): Rena Alexandra   Anesthesia: Spinal    Estimated blood loss: 500 cc    Specimens: 1. Cord blood  2. Cord Gas  3. Placenta  4. Portions of bilateral fallopian tubes   Findings: Dense adhesions of the anterior abdnomal wall to the anterior uterus. Normal tubes and ovaries. Tissue  Dystocia for delivery of the fetal head.    Complications: Tissue dystocia and adhesions   Comments: See dictated operative report for full details       Tonya Navarro

## 2017-12-15 NOTE — PLAN OF CARE
Patient here for scheduled  section and bilateral tubal ligation. Prenatal reviewed. H&P reviewed.  Monitors applioed.

## 2017-12-15 NOTE — PROVIDER NOTIFICATION
12/15/17 1240   Provider Notification   Provider Name/Title Dr. Olivas   Method of Notification Phone   Request Evaluate-Remote   Notification Reason Medication Request   Dr. Olivas paged per patient request for itching. Orders received for Nubain 5mg every 3 hours PRN for itching.

## 2017-12-15 NOTE — H&P
HISTORY OF PRESENT ILLNESS:  Maggy Adams is a 37-year-old female,  3, with a due date of 2017, who is being brought in for repeat  section.  Her pregnancy has been complicated by insulin-dependent gestational diabetes and she is currently on 12 units of NPH each day at bedtime.  Her biophysical profiles have been 8/8, and she has otherwise done well.      PAST OBSTETRICAL HISTORY:   x2.      PAST MEDICAL HISTORY:  Unremarkable.      ALLERGIES:  None known.      REVIEW OF SYSTEMS:  Otherwise negative.      PHYSICAL EXAMINATION:   VITAL SIGNS:  Stable.   NECK:  Supple, no masses.   BACK:  Normal spinal curvature, no CVA tenderness.   HEART:  S1, S2, no S3, S4.  No murmur.  Regular rhythm.   CHEST:  Clear to auscultation.   ABDOMEN:  Contains a term intrauterine pregnancy in the vertex presentation.   PELVIC:  The cervix last week was long, thick and closed.      IMPRESSION:   1.  Intrauterine pregnancy 39 weeks gestation.   2.  Previous  x2.      PLAN:  Repeat  section.  The patient and her  are also requesting tubal ligation, and this procedure will also be performed per Dr. Rodriguez.         BRITTNEE MARAVILLA MD             D: 2017 15:58   T: 2017 17:33   MT: EM#[id number]      Name:     MAGGY ADAMS   MRN:      [id number]        Account:      [id number]   :      1980           Admitted:     [id number]      Document: [id number]

## 2017-12-15 NOTE — IP AVS SNAPSHOT
MRN:[id number]                      After Visit Summary   12/15/2017    Maggy Adams    MRN: [id number]           Thank you!     Thank you for choosing [facility] for your care. Our goal is always to provide you with excellent care. Hearing back from our patients is one way we can continue to improve our services. Please take a few minutes to complete the written survey that you may receive in the mail after you visit. If you would like to speak to someone directly about your visit please contact Patient Relations at [phone]. Thank you!          Patient Information     Date Of Birth          1980        About your hospital stay     You were admitted on:  December 15, 2017 You last received care in the:  [facility] Postpartum    You were discharged on:  2017       Who to Call     For medical emergencies, please call 911.  For non-urgent questions about your medical care, please call your primary care provider or clinic, [phone]  For questions related to your surgery, please call your surgery clinic        Attending Provider     Provider Specialty    Tonya Navarro MD OB/Gyn       Primary Care Provider Office Phone # Fax #    Cici Escobar [phone]      Further instructions from your care team       Postop  Birth Instructions  LACTATION [phone]   RETURN TO CLINIC IN 6 WEEKS    Activity       Do not lift more than 10 pounds for 6 weeks after surgery.  Ask family and friends for help when you need it.    No driving until you have stopped taking your pain medications (usually two weeks after surgery).    No heavy exercise or activity for 6 weeks.  Don't do anything that will put a strain on your surgery site.    Don't strain when using the toilet.  Your care team may prescribe a stool softener if you have problems with your bowel movements.     To care for your incision:       Keep the incision clean and dry.    Do  not soak your incision in water. No swimming or hot tubs until it has fully healed. You may soak in the bathtub if the water level is below your incision.    Do not use peroxide, gel, cream, lotion, or ointment on your incision.    Adjust your clothes to avoid pressure on your surgery site (check the elastic in your underwear for example).     You may see a small amount of clear or pink drainage and this is normal.  Check with your health care provider:       If the drainage increases or has an odor.    If the incision reddens, you have swelling, or develop a rash.    If you have increased pain and the medicine we prescribed doesn't help.    If you have a fever above 100.4 F (38 C) with or without chills when placing thermometer under your tongue.   The area around your incision (surgery wound), will feel numb.  This is normal. The numbness should go away in less than a year.     Keep your hands clean:  Always wash your hands before touching your incision (surgery wound). This helps reduce your risk of infection. If your hands aren't dirty, you may use an alcohol hand-rub to clean your hands. Keep your nails clean and short.    Call your healthcare provider if you have any of these symptoms:       You soak a sanitary pad with blood within 1 hour, or you see blood clots larger than a golf ball.    Bleeding that lasts more than 6 weeks.    Vaginal discharge that smells bad.    Severe pain, cramping or tenderness in your lower belly area.    A need to urinate more frequently (use the toilet more often), more urgently (use the toilet very quickly), or it burns when you urinate.    Nausea and vomiting.    Redness, swelling or pain around a vein in your leg.    Problems breastfeeding or a red or painful area on your breast.    Chest pain and cough or are gasping for air.    Problems with coping with sadness, anxiety or depression. If you have concerns about hurting yourself or the baby, call your provider immediately.   "    You have questions or concerns after you return home.                  Pending Results     Date and Time Order Name Status Description    12/15/2017 0845 Surgical Path Exam In process             Statement of Approval     Ordered          17 0853  I have reviewed and agree with all the recommendations and orders detailed in this document.  EFFECTIVE NOW     Approved and electronically signed by:  Rossy Mendoza PA-C             Admission Information     Date & Time Provider Department Dept. Phone    12/15/2017 Tonya Navarro MD [facility] [phone]      Your Vitals Were     Blood Pressure Pulse Temperature Respirations Pulse Oximetry       103/70 66 97.9  F (36.6  C) (Oral) 16 100%       MyChart Information     [employer] lets you send messages to your doctor, view your test results, renew your prescriptions, schedule appointments and more. To sign up, go to [email or web address] . Click on \"Log in\" on the left side of the screen, which will take you to the Welcome page. Then click on \"Sign up Now\" on the right side of the page.     You will be asked to enter the access code listed below, as well as some personal information. Please follow the directions to create your username and password.     Your access code is: [email or web address]  Expires: 2018 10:07 PM     Your access code will  in 90 days. If you need help or a new code, please call your [facility] clinic or [phone].        Care EveryWhere ID     This is your Care EveryWhere ID. This could be used by other organizations to access your [facility] medical records  [id number]        Equal Access to Services     [facility]: Hadii ayleen reyna Soankit, waaxda luqadaha, qaybta kaalmada rosmery maurice idiin hayaan adeeg kharash la'aan . So [facility] [phone].    ATENCIÓN: Si habla español, tiene a siegel disposición servicios gratuitos de asistencia lingüística. Llame al [phone].    We comply with applicable " federal civil rights laws and [place] laws. We do not discriminate on the basis of race, color, national origin, age, disability, sex, sexual orientation, or gender identity.               Review of your medicines      START taking        Dose / Directions    acetaminophen 325 MG tablet   Commonly known as:  TYLENOL        Dose:  650 mg   Take 2 tablets (650 mg) by mouth every 4 hours as needed for other (surgical pain)   Quantity:  100 tablet   Refills:  0       ferrous sulfate 325 (65 FE) MG tablet   Commonly known as:  IRON        Dose:  325 mg   Take 1 tablet (325 mg) by mouth daily (with breakfast)   Quantity:  30 tablet   Refills:  2       hydrocortisone 2.5 % cream        Place rectally 3 times daily as needed (hemorrhoids)   Refills:  0       ibuprofen 400 MG tablet   Commonly known as:  ADVIL/MOTRIN        Dose:  400-800 mg   Take 1-2 tablets (400-800 mg) by mouth every 6 hours as needed for other (cramping)   Quantity:  120 tablet   Refills:  0       lanolin ointment        Apply topically every hour as needed for dry skin (soreness)   Refills:  0       senna-docusate 8.6-50 MG per tablet   Commonly known as:  SENOKOT-S;PERICOLACE        Dose:  1-2 tablet   Take 1-2 tablets by mouth 2 times daily   Quantity:  100 tablet   Refills:  0         STOP taking     insulin  UNIT/ML injection   Commonly known as:  HumuLIN N/NovoLIN N                Where to get your medicines      Some of these will need a paper prescription and others can be bought over the counter. Ask your nurse if you have questions.     You don't need a prescription for these medications     acetaminophen 325 MG tablet    ferrous sulfate 325 (65 FE) MG tablet    hydrocortisone 2.5 % cream    ibuprofen 400 MG tablet    lanolin ointment    senna-docusate 8.6-50 MG per tablet                Protect others around you: Learn how to safely use, store and throw away your medicines at www.disposemymeds.org.             Medication List: This  is a list of all your medications and when to take them. Check marks below indicate your daily home schedule. Keep this list as a reference.      Medications           Morning Afternoon Evening Bedtime As Needed    acetaminophen 325 MG tablet   Commonly known as:  TYLENOL   Take 2 tablets (650 mg) by mouth every 4 hours as needed for other (surgical pain)   Last time this was given:  975 mg on 12/18/2017  1:09 AM                                ferrous sulfate 325 (65 FE) MG tablet   Commonly known as:  IRON   Take 1 tablet (325 mg) by mouth daily (with breakfast)                                hydrocortisone 2.5 % cream   Place rectally 3 times daily as needed (hemorrhoids)                                ibuprofen 400 MG tablet   Commonly known as:  ADVIL/MOTRIN   Take 1-2 tablets (400-800 mg) by mouth every 6 hours as needed for other (cramping)   Last time this was given:  800 mg on 12/18/2017  3:29 AM                                lanolin ointment   Apply topically every hour as needed for dry skin (soreness)                                senna-docusate 8.6-50 MG per tablet   Commonly known as:  SENOKOT-S;PERICOLACE   Take 1-2 tablets by mouth 2 times daily   Last time this was given:  1 tablet on 12/17/2017  7:55 PM

## 2017-12-16 LAB
GLUCOSE BLDC GLUCOMTR-MCNC: 105 MG/DL (ref 70–99)
HGB BLD-MCNC: 9.5 G/DL (ref 11.7–15.7)

## 2017-12-16 PROCEDURE — 25000132 ZZH RX MED GY IP 250 OP 250 PS 637: Performed by: OBSTETRICS & GYNECOLOGY

## 2017-12-16 PROCEDURE — 25000128 H RX IP 250 OP 636: Performed by: OBSTETRICS & GYNECOLOGY

## 2017-12-16 PROCEDURE — 00000146 ZZHCL STATISTIC GLUCOSE BY METER IP

## 2017-12-16 PROCEDURE — 85018 HEMOGLOBIN: CPT | Performed by: OBSTETRICS & GYNECOLOGY

## 2017-12-16 PROCEDURE — 36415 COLL VENOUS BLD VENIPUNCTURE: CPT | Performed by: OBSTETRICS & GYNECOLOGY

## 2017-12-16 PROCEDURE — 12000027 ZZH R&B OB

## 2017-12-16 RX ADMIN — SENNOSIDES AND DOCUSATE SODIUM 2 TABLET: 8.6; 5 TABLET ORAL at 09:03

## 2017-12-16 RX ADMIN — SENNOSIDES AND DOCUSATE SODIUM 1 TABLET: 8.6; 5 TABLET ORAL at 21:43

## 2017-12-16 RX ADMIN — KETOROLAC TROMETHAMINE 30 MG: 30 INJECTION, SOLUTION INTRAMUSCULAR at 03:23

## 2017-12-16 RX ADMIN — IBUPROFEN 800 MG: 400 TABLET ORAL at 09:28

## 2017-12-16 RX ADMIN — ACETAMINOPHEN 975 MG: 325 TABLET, FILM COATED ORAL at 16:45

## 2017-12-16 RX ADMIN — ACETAMINOPHEN 975 MG: 325 TABLET, FILM COATED ORAL at 09:03

## 2017-12-16 RX ADMIN — IBUPROFEN 800 MG: 400 TABLET ORAL at 16:04

## 2017-12-16 RX ADMIN — ACETAMINOPHEN 975 MG: 325 TABLET, FILM COATED ORAL at 00:20

## 2017-12-16 NOTE — PLAN OF CARE
Problem: Patient Care Overview  Goal: Plan of Care/Patient Progress Review  Outcome: Improving  BP running soft, asymptomatic.  Other VSS. Pain well controlled with toradol and tylenol.  Incision with moist drainage.  Saline locked.  Tolerating reg diet.  Chapa removed at 0630; due to void.   Breastfeeding excellent.

## 2017-12-16 NOTE — PLAN OF CARE
Problem: Patient Care Overview  Goal: Plan of Care/Patient Progress Review  Assisted pt up to dangle and ambulated to bathroom without difficulty. Chapa patent and secured.Incision covered with Tegaderm some moist drainage . Reports adequate pain control with current pain plan. Family present and supportive. Meeting expected goals. Mother attentive to infants needs. Breastfeeding with occasional infant supplementation.

## 2017-12-16 NOTE — PLAN OF CARE
Problem: Patient Care Overview  Goal: Plan of Care/Patient Progress Review  Outcome: Improving  Data: BPs low, patient asymptomatic. All other vital signs within normal limits. Postpartum checks within normal limits - see flow record. Patient eating and drinking normally. Patient able to empty bladder independently and is up ambulating. No apparent signs of infection. Incision healing well. Patient performing self cares and is able to care for infant. BG monitoring complete.  Action: Patient medicated during the shift for pain. See MAR. Patient reassessed within 1 hour after each medication and pain was improved - patient stated she was comfortable. Patient education done about postpartum cares. See flow record.  Response: Positive attachment behaviors observed with infant. Support persons present.   Plan: Anticipate discharge on 12/18/17.

## 2017-12-17 PROCEDURE — 25000132 ZZH RX MED GY IP 250 OP 250 PS 637: Performed by: OBSTETRICS & GYNECOLOGY

## 2017-12-17 PROCEDURE — 12000027 ZZH R&B OB

## 2017-12-17 RX ADMIN — IBUPROFEN 800 MG: 400 TABLET ORAL at 19:55

## 2017-12-17 RX ADMIN — ACETAMINOPHEN 975 MG: 325 TABLET, FILM COATED ORAL at 01:06

## 2017-12-17 RX ADMIN — IBUPROFEN 800 MG: 400 TABLET ORAL at 13:26

## 2017-12-17 RX ADMIN — SENNOSIDES AND DOCUSATE SODIUM 1 TABLET: 8.6; 5 TABLET ORAL at 19:55

## 2017-12-17 RX ADMIN — ACETAMINOPHEN 975 MG: 325 TABLET, FILM COATED ORAL at 08:52

## 2017-12-17 RX ADMIN — ACETAMINOPHEN 975 MG: 325 TABLET, FILM COATED ORAL at 17:15

## 2017-12-17 NOTE — PLAN OF CARE
Problem: Patient Care Overview  Goal: Plan of Care/Patient Progress Review  Outcome: Improving  Patient independent with self and infant cares. Vitally stable, voiding without issue, postpartum checks WDL. Incision with tegaderm in place, intact, some moist drainage noted underneath, unchanged this shift. Patient c/o incisional pain, 5-6/10, scheduled Tylenol and prn Ibuprofen given with stated decrease in pain. Breastfeeding, going well per patient report. Attentive to infant.  and other children present and supportive.

## 2017-12-17 NOTE — PLAN OF CARE
VSS, independent with self and baby cares. Ambulating halls. Pain controlled with Ibuprofen and Tylenol.

## 2017-12-17 NOTE — PLAN OF CARE
Assumed patient cares at 1915 and will maintain until 0700 12/17. Patient independent with self care and infant cares in room. FF U/U with scant to small flow. Incision covered with tegaderm with scant drainage. Pain controled with oral meds. Voiding in good amounts. Plans to rest through the night with infant in nursery and will come out for breast feedings. Will monitor.

## 2017-12-18 VITALS
OXYGEN SATURATION: 100 % | DIASTOLIC BLOOD PRESSURE: 70 MMHG | TEMPERATURE: 97.9 F | RESPIRATION RATE: 16 BRPM | SYSTOLIC BLOOD PRESSURE: 103 MMHG | HEART RATE: 66 BPM

## 2017-12-18 LAB — COPATH REPORT: NORMAL

## 2017-12-18 PROCEDURE — 40000083 ZZH STATISTIC IP LACTATION SERVICES 1-15 MIN

## 2017-12-18 PROCEDURE — 25000132 ZZH RX MED GY IP 250 OP 250 PS 637: Performed by: OBSTETRICS & GYNECOLOGY

## 2017-12-18 RX ORDER — AMOXICILLIN 250 MG
1-2 CAPSULE ORAL 2 TIMES DAILY
Qty: 100 TABLET | COMMUNITY
Start: 2017-12-18 | End: 2018-08-06

## 2017-12-18 RX ORDER — LANOLIN 100 %
OINTMENT (GRAM) TOPICAL
COMMUNITY
Start: 2017-12-18 | End: 2018-08-06

## 2017-12-18 RX ORDER — HYDROCORTISONE 2.5 %
CREAM (GRAM) TOPICAL 3 TIMES DAILY PRN
COMMUNITY
Start: 2017-12-18 | End: 2018-08-06

## 2017-12-18 RX ORDER — ACETAMINOPHEN 325 MG/1
650 TABLET ORAL EVERY 4 HOURS PRN
Qty: 100 TABLET | COMMUNITY
Start: 2017-12-18

## 2017-12-18 RX ORDER — FERROUS SULFATE 325(65) MG
325 TABLET ORAL
Qty: 30 TABLET | Refills: 2 | COMMUNITY
Start: 2017-12-18 | End: 2018-08-06

## 2017-12-18 RX ORDER — LIDOCAINE HYDROCHLORIDE 10 MG/ML
INJECTION, SOLUTION EPIDURAL; INFILTRATION; INTRACAUDAL; PERINEURAL
Status: DISCONTINUED
Start: 2017-12-18 | End: 2017-12-18 | Stop reason: HOSPADM

## 2017-12-18 RX ORDER — IBUPROFEN 400 MG/1
400-800 TABLET, FILM COATED ORAL EVERY 6 HOURS PRN
Qty: 120 TABLET | COMMUNITY
Start: 2017-12-18

## 2017-12-18 RX ADMIN — ACETAMINOPHEN 650 MG: 325 TABLET, FILM COATED ORAL at 10:15

## 2017-12-18 RX ADMIN — ACETAMINOPHEN 975 MG: 325 TABLET, FILM COATED ORAL at 01:09

## 2017-12-18 RX ADMIN — IBUPROFEN 800 MG: 400 TABLET ORAL at 10:15

## 2017-12-18 RX ADMIN — IBUPROFEN 800 MG: 400 TABLET ORAL at 03:29

## 2017-12-18 RX ADMIN — SENNOSIDES AND DOCUSATE SODIUM 1 TABLET: 8.6; 5 TABLET ORAL at 10:15

## 2017-12-18 NOTE — PROGRESS NOTES
#3 Postop Repeat . Tubal Ligation.    Pt states doing well.  Pain well controlled.  Nursing.    Afebrile VSS. HGB 9.5  Fundus firm, light flow.  Incision dry and intact.  Voiding w/o problems. Had BM.  Ext w/o edema or pain.    Normal postop course.    Plan routine postop care.  Inst for discharge home. Diet inst reviewed. Monitor BS levels  Recheck at OBGYN Specialist in 6 week or prn if problems.

## 2017-12-18 NOTE — PLAN OF CARE
Problem: Patient Care Overview  Goal: Plan of Care/Patient Progress Review  Outcome: Improving  VSS.  Up ad pham.  Pain well controlled with ibuprofen and tylenol.  Voiding.  Ambulating.  Breastfeeding excellent.  Occasional manual pumping to reduce fullness.  Plan to discharge today.

## 2017-12-18 NOTE — DISCHARGE INSTRUCTIONS
Postop  Birth Instructions  LACTATION [phone]   RETURN TO CLINIC IN 6 WEEKS    Activity       Do not lift more than 10 pounds for 6 weeks after surgery.  Ask family and friends for help when you need it.    No driving until you have stopped taking your pain medications (usually two weeks after surgery).    No heavy exercise or activity for 6 weeks.  Don't do anything that will put a strain on your surgery site.    Don't strain when using the toilet.  Your care team may prescribe a stool softener if you have problems with your bowel movements.     To care for your incision:       Keep the incision clean and dry.    Do not soak your incision in water. No swimming or hot tubs until it has fully healed. You may soak in the bathtub if the water level is below your incision.    Do not use peroxide, gel, cream, lotion, or ointment on your incision.    Adjust your clothes to avoid pressure on your surgery site (check the elastic in your underwear for example).     You may see a small amount of clear or pink drainage and this is normal.  Check with your health care provider:       If the drainage increases or has an odor.    If the incision reddens, you have swelling, or develop a rash.    If you have increased pain and the medicine we prescribed doesn't help.    If you have a fever above 100.4 F (38 C) with or without chills when placing thermometer under your tongue.   The area around your incision (surgery wound), will feel numb.  This is normal. The numbness should go away in less than a year.     Keep your hands clean:  Always wash your hands before touching your incision (surgery wound). This helps reduce your risk of infection. If your hands aren't dirty, you may use an alcohol hand-rub to clean your hands. Keep your nails clean and short.    Call your healthcare provider if you have any of these symptoms:       You soak a sanitary pad with blood within 1 hour, or you see blood clots larger than a  golf ball.    Bleeding that lasts more than 6 weeks.    Vaginal discharge that smells bad.    Severe pain, cramping or tenderness in your lower belly area.    A need to urinate more frequently (use the toilet more often), more urgently (use the toilet very quickly), or it burns when you urinate.    Nausea and vomiting.    Redness, swelling or pain around a vein in your leg.    Problems breastfeeding or a red or painful area on your breast.    Chest pain and cough or are gasping for air.    Problems with coping with sadness, anxiety or depression. If you have concerns about hurting yourself or the baby, call your provider immediately.      You have questions or concerns after you return home.

## 2017-12-18 NOTE — PLAN OF CARE
Problem: Patient Care Overview  Goal: Plan of Care/Patient Progress Review  Outcome: Improving  Pt up ad pham, pain controlled, milk came in pt started pumping to relieve fullness.

## 2017-12-18 NOTE — LACTATION NOTE
LC to see patient.  Her milk is in and baby has been spitty due to volume of milk obtained.  She is no longer formula feeding.  LC discussed her plan for pumping and how to avoid mastitis.  LC recommended breastfeeding on both sides if able, or removing the pressure from the second side via pump.  LC cautioned against fully emptying her breasts and discussed supply/demand and overproduction.  She is aware she may call prn.

## 2017-12-18 NOTE — PLAN OF CARE
Problem: Patient Care Overview  Goal: Plan of Care/Patient Progress Review  Outcome: Improving  Pt stable, vitals wnl. Breastfeeding well ind. Pain managed well with ibuprofen and tylenol. Pt ambulating and voiding ind. Pt showered this shift. Discharge instructions reviewed with pt, all questions answered. Pt already had pump. Pt discharged home at 1415

## 2017-12-21 ENCOUNTER — HOSPITAL ENCOUNTER (OUTPATIENT)
Facility: CLINIC | Age: 37
Discharge: HOME | End: 2017-12-21
Attending: OBSTETRICS & GYNECOLOGY | Admitting: OBSTETRICS & GYNECOLOGY
Payer: COMMERCIAL

## 2017-12-21 ENCOUNTER — APPOINTMENT (OUTPATIENT)
Dept: ULTRASOUND IMAGING | Facility: CLINIC | Age: 37
End: 2017-12-21
Attending: OBSTETRICS & GYNECOLOGY
Payer: COMMERCIAL

## 2017-12-21 VITALS — TEMPERATURE: 98.9 F | SYSTOLIC BLOOD PRESSURE: 106 MMHG | RESPIRATION RATE: 16 BRPM | DIASTOLIC BLOOD PRESSURE: 68 MMHG

## 2017-12-21 DIAGNOSIS — Z98.891 S/P REPEAT LOW TRANSVERSE C-SECTION: Primary | ICD-10-CM

## 2017-12-21 LAB
ALT SERPL W P-5'-P-CCNC: 21 U/L (ref 0–50)
AST SERPL W P-5'-P-CCNC: 18 U/L (ref 0–45)
CREAT SERPL-MCNC: 0.5 MG/DL (ref 0.52–1.04)
CREAT UR-MCNC: 134 MG/DL
ERYTHROCYTE [DISTWIDTH] IN BLOOD BY AUTOMATED COUNT: 14.7 % (ref 10–15)
GFR SERPL CREATININE-BSD FRML MDRD: >90 ML/MIN/1.7M2
HCT VFR BLD AUTO: 31.5 % (ref 35–47)
HGB BLD-MCNC: 10 G/DL (ref 11.7–15.7)
MCH RBC QN AUTO: 28.3 PG (ref 26.5–33)
MCHC RBC AUTO-ENTMCNC: 31.7 G/DL (ref 31.5–36.5)
MCV RBC AUTO: 89 FL (ref 78–100)
PLATELET # BLD AUTO: 340 10E9/L (ref 150–450)
PROT UR-MCNC: 0.18 G/L
PROT/CREAT 24H UR: 0.13 G/G CR (ref 0–0.2)
RBC # BLD AUTO: 3.53 10E12/L (ref 3.8–5.2)
URATE SERPL-MCNC: 4.7 MG/DL (ref 2.6–6)
WBC # BLD AUTO: 7.7 10E9/L (ref 4–11)

## 2017-12-21 PROCEDURE — 84550 ASSAY OF BLOOD/URIC ACID: CPT | Performed by: OBSTETRICS & GYNECOLOGY

## 2017-12-21 PROCEDURE — 85027 COMPLETE CBC AUTOMATED: CPT | Performed by: OBSTETRICS & GYNECOLOGY

## 2017-12-21 PROCEDURE — 84460 ALANINE AMINO (ALT) (SGPT): CPT | Performed by: OBSTETRICS & GYNECOLOGY

## 2017-12-21 PROCEDURE — 76705 ECHO EXAM OF ABDOMEN: CPT

## 2017-12-21 PROCEDURE — 36415 COLL VENOUS BLD VENIPUNCTURE: CPT | Performed by: OBSTETRICS & GYNECOLOGY

## 2017-12-21 PROCEDURE — 99214 OFFICE O/P EST MOD 30 MIN: CPT

## 2017-12-21 PROCEDURE — 84450 TRANSFERASE (AST) (SGOT): CPT | Performed by: OBSTETRICS & GYNECOLOGY

## 2017-12-21 PROCEDURE — 84156 ASSAY OF PROTEIN URINE: CPT | Performed by: OBSTETRICS & GYNECOLOGY

## 2017-12-21 PROCEDURE — 82565 ASSAY OF CREATININE: CPT | Performed by: OBSTETRICS & GYNECOLOGY

## 2017-12-21 RX ORDER — OXYCODONE HYDROCHLORIDE 5 MG/1
5-10 TABLET ORAL EVERY 4 HOURS PRN
Qty: 20 TABLET | Refills: 0 | Status: SHIPPED | OUTPATIENT
Start: 2017-12-21 | End: 2018-08-06

## 2017-12-21 RX ORDER — ONDANSETRON 2 MG/ML
4 INJECTION INTRAMUSCULAR; INTRAVENOUS EVERY 6 HOURS PRN
Status: DISCONTINUED | OUTPATIENT
Start: 2017-12-21 | End: 2017-12-21 | Stop reason: HOSPADM

## 2017-12-21 NOTE — IP AVS SNAPSHOT
"                  MRN:[id number]                      After Visit Summary   12/21/2017    Maggy Adams    MRN: [id number]           Thank you!     Thank you for choosing [facility] for your care. Our goal is always to provide you with excellent care. Hearing back from our patients is one way we can continue to improve our services. Please take a few minutes to complete the written survey that you may receive in the mail after you visit. If you would like to speak to someone directly about your visit please contact Patient Relations at [phone]. Thank you!          Patient Information     Date Of Birth          1980        About your hospital stay     You were admitted on:  December 21, 2017 You last received care in the:  [facility] Labor and Delivery    You were discharged on:  December 21, 2017       Who to Call     For medical emergencies, please call 911.  For non-urgent questions about your medical care, please call your primary care provider or clinic, [phone]          Attending Provider     Provider Specialty    Estuardo Menjivar MD OB/Gyn       Primary Care Provider Office Phone # Fax #    Cici Escobar [phone]      Further instructions from your care team       Follow previous post partum discharge instructions. Follow up in clinic.    Pending Results     No orders found from 12/19/2017 to 12/22/2017.            Admission Information     Date & Time Provider Department Dept. Phone    12/21/2017 Estuardo Menjivar MD [facility] Labor and Delivery [phone]      Your Vitals Were     Blood Pressure Temperature Respirations             106/68 98.9  F (37.2  C) (Oral) 16         MyChart Information     [employer] lets you send messages to your doctor, view your test results, renew your prescriptions, schedule appointments and more. To sign up, go to [email or web address] . Click on \"Log in\" on the left side of the screen, which will take you to the " "Welcome page. Then click on \"Sign up Now\" on the right side of the page.     You will be asked to enter the access code listed below, as well as some personal information. Please follow the directions to create your username and password.     Your access code is: [email or web address]  Expires: 2018 10:07 PM     Your access code will  in 90 days. If you need help or a new code, please call your [place] clinic or [phone].        Care EveryWhere ID     This is your Care EveryWhere ID. This could be used by other organizations to access your [place] medical records  [id number]        Equal Access to Services     [facility] : Arie Mercado, omar raya, kelechi maurice, rosmery roth . So [facility] [place] [facility] [phone].    ATENCIÓN: Si habla español, tiene a siegel disposición servicios gratuitos de asistencia lingüística. Llame al [phone].    We comply with applicable federal civil rights laws and [place] laws. We do not discriminate on the basis of race, color, national origin, age, disability, sex, sexual orientation, or gender identity.               Review of your medicines      UNREVIEWED medicines. Ask your doctor about these medicines        Dose / Directions    acetaminophen 325 MG tablet   Commonly known as:  TYLENOL   Used for:  S/P repeat low transverse         Dose:  650 mg   Take 2 tablets (650 mg) by mouth every 4 hours as needed for other (surgical pain)   Quantity:  100 tablet   Refills:  0       ferrous sulfate 325 (65 FE) MG tablet   Commonly known as:  IRON   Used for:  S/P repeat low transverse         Dose:  325 mg   Take 1 tablet (325 mg) by mouth daily (with breakfast)   Quantity:  30 tablet   Refills:  2       hydrocortisone 2.5 % cream   Used for:  S/P repeat low transverse         Place rectally 3 times daily as needed (hemorrhoids)   Refills:  0       ibuprofen 400 MG tablet   Commonly known as:  " ADVIL/MOTRIN   Used for:  S/P repeat low transverse         Dose:  400-800 mg   Take 1-2 tablets (400-800 mg) by mouth every 6 hours as needed for other (cramping)   Quantity:  120 tablet   Refills:  0       lanolin ointment   Used for:  S/P repeat low transverse         Apply topically every hour as needed for dry skin (soreness)   Refills:  0       senna-docusate 8.6-50 MG per tablet   Commonly known as:  SENOKOT-S;PERICOLACE   Used for:  S/P repeat low transverse         Dose:  1-2 tablet   Take 1-2 tablets by mouth 2 times daily   Quantity:  100 tablet   Refills:  0                Protect others around you: Learn how to safely use, store and throw away your medicines at www.disposemymeds.org.             Medication List: This is a list of all your medications and when to take them. Check marks below indicate your daily home schedule. Keep this list as a reference.      Medications           Morning Afternoon Evening Bedtime As Needed    acetaminophen 325 MG tablet   Commonly known as:  TYLENOL   Take 2 tablets (650 mg) by mouth every 4 hours as needed for other (surgical pain)                                ferrous sulfate 325 (65 FE) MG tablet   Commonly known as:  IRON   Take 1 tablet (325 mg) by mouth daily (with breakfast)                                hydrocortisone 2.5 % cream   Place rectally 3 times daily as needed (hemorrhoids)                                ibuprofen 400 MG tablet   Commonly known as:  ADVIL/MOTRIN   Take 1-2 tablets (400-800 mg) by mouth every 6 hours as needed for other (cramping)                                lanolin ointment   Apply topically every hour as needed for dry skin (soreness)                                senna-docusate 8.6-50 MG per tablet   Commonly known as:  SENOKOT-S;PERICOLACE   Take 1-2 tablets by mouth 2 times daily

## 2017-12-21 NOTE — PLAN OF CARE
Data: [facility] presented to [facility]: 12/21/2017  4:22 PM.  Reason for maternal assessment is rule out pre-eclampsia. Pt delivered via repeat c/s on 12/15/17. Patient reports sharp, constant upper abdominal pain and lower leg swelling that started Tuesday. Also started to have a headache today that in describes as achy from front of head to middle of head.  Gestational Age 39w3d. VSS. Fetal movement present. Patient denies nausea, vomiting. Support person is present.   Action: Triage assessment completed. Bill of rights reviewed.  Response: Patient verbalized agreement with plan. Will contact Dr Estuardo Menjivar with update and further orders.

## 2017-12-21 NOTE — IP AVS SNAPSHOT
[facility]    [address]    [facility] [address]    Phone:  [phone]    Fax:  [phone]                                       After Visit Summary   12/21/2017    Maggy Adams    MRN: [id number]           After Visit Summary Signature Page     I have received my discharge instructions, and my questions have been answered. I have discussed any challenges I see with this plan with the nurse or doctor.    ..........................................................................................................................................  Patient/Patient Representative Signature      ..........................................................................................................................................  Patient Representative Print Name and Relationship to Patient    ..................................................               ................................................  Date                                            Time    ..........................................................................................................................................  Reviewed by Signature/Title    ...................................................              ..............................................  Date                                                            Time

## 2017-12-21 NOTE — PROVIDER NOTIFICATION
12/21/17 1741   Provider Notification   Provider Name/Title Dr Menjivar   Method of Notification Phone   Request Evaluate-Remote   Notification Reason Status Update   Dr Menjivar updated re: pt story, assessment, BP's, and lab results. Dr MARLYS Villa will be in house physician at 1800, will see patient and then she may be d/c'd to home.

## 2017-12-22 NOTE — PLAN OF CARE
Dr MARLYS Collado at bedside evaluating patient. Orders for right upper quadrant US to r/o gallstones. To call dr collado with results and then pt may d/c to home.

## 2017-12-22 NOTE — H&P
Maggy Adams is a 37 year old  @ 39w3d who presents 7 days s/p  section. She notes onset of upper abdominal pain today accompanied by headache and swelling. She denies n/v but notes the pain is worsened after eating. Denies fevers.    PMH: None  PSH: C/s  NKDA    PE: /68  Temp 98.9  F (37.2  C) (Oral)  Resp 16   NAD, A&O x 3  Abd: Soft, ND. +tender LUQ, RUQ, epigastrium to deep palpation, no rebound/guarding  Inc: CDI, NT in lower quadrants  Ext: 1+ edema, NT    Hemoglobin   Date Value Ref Range Status   2017 10.0 (L) 11.7 - 15.7 g/dL Final     ALT 21  AST18    RUQ US negative    A/P: 37 year old  @ 39w3d with upper abdominal pain.  Normal Bps and PIH labs, no evidence of pre-eclampsia. RUQ US is normal. Will d/c home, call with worsening sx. Rx for oxycodone given- pt was not given any upon discharge.    Karly Villa

## 2018-01-12 NOTE — DISCHARGE SUMMARY
DATE OF ADMISSION:  12/15/2017      DATE OF DISCHARGE:  2017       DIAGNOSES ON ADMISSION:   1.  Prior  section.   2.  Desiring sterilization.      DISCHARGE DIAGNOSES:   1.  Prior  section.   2.  Desiring sterilization.      HOSPITAL COURSE:  Maggy Adams was admitted on 12/15.  She underwent a repeat low transverse  section with bilateral tubal ligation.  She also had significant lysis of adhesions.  By postoperative day #1, she was ambulating, tolerating full diet, and pain was controlled with oral pain medication.  By postoperative day #2, she was meeting full milestones and was discharged home with routine teaching and to follow up in the clinic 6 weeks postop.         MARISELA GARCIA MD             D: 2018 11:24   T: 2018 11:51   MT: [id number]      Name:     MAGGY ADAMS   MRN:      [id number]        Account:        [id number]   :      1980           Admit Date:     [id number]                                  Discharge Date: 2017      Document: [id number]

## 2018-08-06 ENCOUNTER — OFFICE VISIT (OUTPATIENT)
Dept: INTERNAL MEDICINE | Facility: CLINIC | Age: 38
End: 2018-08-06
Payer: COMMERCIAL

## 2018-08-06 VITALS
HEART RATE: 92 BPM | TEMPERATURE: 97.9 F | HEIGHT: 65 IN | RESPIRATION RATE: 14 BRPM | DIASTOLIC BLOOD PRESSURE: 62 MMHG | SYSTOLIC BLOOD PRESSURE: 102 MMHG | OXYGEN SATURATION: 99 % | WEIGHT: 203 LBS | BODY MASS INDEX: 33.82 KG/M2

## 2018-08-06 DIAGNOSIS — Z00.00 HEALTH CARE MAINTENANCE: Primary | ICD-10-CM

## 2018-08-06 LAB
ANION GAP SERPL CALCULATED.3IONS-SCNC: 5 MMOL/L (ref 3–14)
BUN SERPL-MCNC: 23 MG/DL (ref 7–30)
CALCIUM SERPL-MCNC: 8.4 MG/DL (ref 8.5–10.1)
CHLORIDE SERPL-SCNC: 106 MMOL/L (ref 94–109)
CHOLEST SERPL-MCNC: 156 MG/DL
CO2 SERPL-SCNC: 28 MMOL/L (ref 20–32)
CREAT SERPL-MCNC: 0.55 MG/DL (ref 0.52–1.04)
ERYTHROCYTE [DISTWIDTH] IN BLOOD BY AUTOMATED COUNT: 14.3 % (ref 10–15)
GFR SERPL CREATININE-BSD FRML MDRD: >90 ML/MIN/1.7M2
GLUCOSE SERPL-MCNC: 105 MG/DL (ref 70–99)
HBA1C MFR BLD: 5.2 % (ref 0–5.6)
HCT VFR BLD AUTO: 35.4 % (ref 35–47)
HDLC SERPL-MCNC: 45 MG/DL
HGB BLD-MCNC: 11 G/DL (ref 11.7–15.7)
LDLC SERPL CALC-MCNC: 91 MG/DL
MCH RBC QN AUTO: 28.8 PG (ref 26.5–33)
MCHC RBC AUTO-ENTMCNC: 31.1 G/DL (ref 31.5–36.5)
MCV RBC AUTO: 93 FL (ref 78–100)
NONHDLC SERPL-MCNC: 111 MG/DL
PLATELET # BLD AUTO: 325 10E9/L (ref 150–450)
POTASSIUM SERPL-SCNC: 4 MMOL/L (ref 3.4–5.3)
RBC # BLD AUTO: 3.82 10E12/L (ref 3.8–5.2)
SODIUM SERPL-SCNC: 139 MMOL/L (ref 133–144)
TRIGL SERPL-MCNC: 101 MG/DL
TSH SERPL DL<=0.005 MIU/L-ACNC: 3.44 MU/L (ref 0.4–4)
WBC # BLD AUTO: 6.5 10E9/L (ref 4–11)

## 2018-08-06 PROCEDURE — 99202 OFFICE O/P NEW SF 15 MIN: CPT | Performed by: NURSE PRACTITIONER

## 2018-08-06 PROCEDURE — 36415 COLL VENOUS BLD VENIPUNCTURE: CPT | Performed by: NURSE PRACTITIONER

## 2018-08-06 PROCEDURE — 80048 BASIC METABOLIC PNL TOTAL CA: CPT | Performed by: NURSE PRACTITIONER

## 2018-08-06 PROCEDURE — 83036 HEMOGLOBIN GLYCOSYLATED A1C: CPT | Performed by: NURSE PRACTITIONER

## 2018-08-06 PROCEDURE — 85027 COMPLETE CBC AUTOMATED: CPT | Performed by: NURSE PRACTITIONER

## 2018-08-06 PROCEDURE — 80061 LIPID PANEL: CPT | Performed by: NURSE PRACTITIONER

## 2018-08-06 PROCEDURE — 84443 ASSAY THYROID STIM HORMONE: CPT | Performed by: NURSE PRACTITIONER

## 2018-08-06 NOTE — LETTER
August 6, 2018      Maggy Adams  [address] [facility] [address]        Dear ,    We are writing to inform you of your test results.    Your glucose is elevated at 105 (normal <100) but not high enough to make you a diabetic. But it does indicate you are at high risk for developing diabetes mellitus. Exercise, avoiding simple carbohydrates in your diet and wt loss will all help to lower this risk. Please work on these as you can and I recommend rechecking fasting blood sugar in 12 months.   Your hemoglobin is mildly low, daily iron supplements are recommended.   The rest of your results are in normal range.     Resulted Orders   CBC with platelets   Result Value Ref Range    WBC 6.5 4.0 - 11.0 10e9/L    RBC Count 3.82 3.8 - 5.2 10e12/L    Hemoglobin 11.0 (L) 11.7 - 15.7 g/dL      Comment:      Reviewed: OK with previous    Hematocrit 35.4 35.0 - 47.0 %    MCV 93 78 - 100 fl    MCH 28.8 26.5 - 33.0 pg    MCHC 31.1 (L) 31.5 - 36.5 g/dL    RDW 14.3 10.0 - 15.0 %    Platelet Count 325 150 - 450 10e9/L   Basic metabolic panel   Result Value Ref Range    Sodium 139 133 - 144 mmol/L    Potassium 4.0 3.4 - 5.3 mmol/L    Chloride 106 94 - 109 mmol/L    Carbon Dioxide 28 20 - 32 mmol/L    Anion Gap 5 3 - 14 mmol/L    Glucose 105 (H) 70 - 99 mg/dL    Urea Nitrogen 23 7 - 30 mg/dL    Creatinine 0.55 0.52 - 1.04 mg/dL    GFR Estimate >90 >60 mL/min/1.7m2      Comment:      Non  GFR Calc    GFR Estimate If Black >90 >60 mL/min/1.7m2      Comment:       GFR Calc    Calcium 8.4 (L) 8.5 - 10.1 mg/dL   Hemoglobin A1c   Result Value Ref Range    Hemoglobin A1C 5.2 0 - 5.6 %      Comment:      Normal <5.7% Prediabetes 5.7-6.4%  Diabetes 6.5% or higher - adopted from ADA   consensus guidelines.     Lipid Profile   Result Value Ref Range    Cholesterol 156 <200 mg/dL    Triglycerides 101 <150 mg/dL    HDL Cholesterol 45 (L) >49 mg/dL    LDL Cholesterol Calculated 91 <100 mg/dL       Comment:      Desirable:       <100 mg/dl    Non HDL Cholesterol 111 <130 mg/dL   TSH with free T4 reflex   Result Value Ref Range    TSH 3.44 0.40 - 4.00 mU/L       If you have any questions or concerns, please call the clinic at the number listed above.       Sincerely,        Shyann Hatch NP

## 2018-08-06 NOTE — PROGRESS NOTES
SUBJECTIVE:   Maggy Adams is a 38 year old female who presents to clinic today for the following health issues:      New Patient/Transfer of Care        Problem list and histories reviewed & adjusted, as indicated.  Additional history: as documented    Patient Active Problem List   Diagnosis     Encounter for triage in pregnant patient     S/P repeat low transverse      Indication for care in labor and delivery, delivered     Past Surgical History:   Procedure Laterality Date      SECTION        SECTION        SECTION, TUBAL LIGATION, COMBINED N/A 12/15/2017    Procedure: COMBINED  SECTION, TUBAL LIGATION;  Repeat  section with bilateral tubal ligation;  Surgeon: Tonya Navarro MD;  Location: [facility]       Social History   Substance Use Topics     Smoking status: Never Smoker     Smokeless tobacco: Never Used     Alcohol use No     History reviewed. No pertinent family history.      Current Outpatient Prescriptions   Medication Sig Dispense Refill     acetaminophen (TYLENOL) 325 MG tablet Take 2 tablets (650 mg) by mouth every 4 hours as needed for other (surgical pain) 100 tablet      ibuprofen (ADVIL/MOTRIN) 400 MG tablet Take 1-2 tablets (400-800 mg) by mouth every 6 hours as needed for other (cramping) 120 tablet      Recent Labs   Lab Test  17   1655  17   0938   ALT  21   --    CR  0.50*  0.57   GFRESTIMATED  >90  >90   GFRESTBLACK  >90  >90   POTASSIUM   --   3.7      BP Readings from Last 3 Encounters:   18 102/62   17 106/68   17 103/70    Wt Readings from Last 3 Encounters:   18 203 lb (92.1 kg)                    Reviewed and updated as needed this visit by clinical staff  Tobacco  Allergies  Meds  Med Hx  Surg Hx  Fam Hx  Soc Hx      Reviewed and updated as needed this visit by Provider         ROS:  Constitutional, HEENT, cardiovascular, pulmonary, gi and gu systems are negative, except as otherwise  "noted.    OBJECTIVE:     /62 (BP Location: Right arm, Patient Position: Sitting, Cuff Size: Adult Large)  Pulse 92  Temp 97.9  F (36.6  C) (Oral)  Resp 14  Ht 5' 5.28\" (1.658 m)  Wt 203 lb (92.1 kg)  LMP 07/27/2018 (Approximate)  SpO2 99%  Breastfeeding? No  BMI 33.5 kg/m2  Body mass index is 33.5 kg/(m^2).  GENERAL: alert, no distress and obese        ASSESSMENT/PLAN:               ICD-10-CM    1. Health care maintenance Z00.00 CBC with platelets     Basic metabolic panel     Hemoglobin A1c     Lipid Profile     TSH with free T4 reflex       Schedule Pap    Shyann Hatch NP  [facility]    "

## 2018-08-06 NOTE — MR AVS SNAPSHOT
"              After Visit Summary   8/6/2018    Maggy Adams    MRN: [id number]           Patient Information     Date Of Birth          1980        Visit Information        Provider Department      8/6/2018 7:40 AM Shyann Hatch NP [facility]        [facility]'s [facility]     Health care maintenance    -  1       Follow-ups after your visit        Your next 10 appointments already scheduled     Aug 13, 2018 12:40 PM CDT   SHORT with Shyann Hatch NP   [facility] ([facility])    [address]  [facility] [id number] [phone]              Who to contact     If you have questions or need follow up information about today's clinic visit or your schedule please contact [facility] directly at [phone].  Normal or non-critical lab and imaging results will be communicated to you by MyChart, letter or phone within 4 business days after the clinic has received the results. If you do not hear from us within 7 days, please contact the clinic through MyChart or phone. If you have a critical or abnormal lab result, we will notify you by phone as soon as possible.  Submit refill requests through [employer] or call your pharmacy and they will forward the refill request to us. Please allow 3 business days for your refill to be completed.          Additional Information About Your Visit        MyChart Information     [employer] lets you send messages to your doctor, view your test results, renew your prescriptions, schedule appointments and more. To sign up, go to [email or web address] . Click on \"Log in\" on the left side of the screen, which will take you to the Welcome page. Then click on \"Sign up Now\" on the right side of the page.     You will be asked to enter the access code listed below, as well as some personal information. Please follow the directions to create your username and password.     Your access code is: " "[email or web address]  Expires: 2018  8:19 AM     Your access code will  in 90 days. If you need help or a new code, please call your [place] clinic or [phone].        Care EveryWhere ID     This is your Care EveryWhere ID. This could be used by other organizations to access your [place] medical records  [id number]        Your Vitals Were     Pulse Temperature Respirations Height Last Period Pulse Oximetry    92 97.9  F (36.6  C) (Oral) 14 5' 5.28\" (1.658 m) 2018 (Approximate) 99%    Breastfeeding? BMI (Body Mass Index)                No 33.5 kg/m2           Blood Pressure from Last 3 Encounters:   18 102/62   17 106/68   17 103/70    Weight from Last 3 Encounters:   18 203 lb (92.1 kg)              We Performed the Following     Basic metabolic panel     CBC with platelets     Hemoglobin A1c     Lipid Profile     TSH with free T4 reflex        Primary Care Provider Office Phone # Fax #    Cici SHALOM Escobar [phone]       [facility] [address]  [place] [address]        Equal Access to Services     [facility] AH: Hadii aad ku hadasho Soomaali, waaxda luqadaha, qaybta kaalmada adeegyada, rosmery roth . So [facility] [phone].    ATENCIÓN: Si habla español, tiene a siegel disposición servicios gratuitos de asistencia lingüística. Samina al [phone].    We comply with applicable federal civil rights laws and [place] laws. We do not discriminate on the basis of race, color, national origin, age, disability, sex, sexual orientation, or gender identity.            Thank you!     Thank you for choosing [facility]  for your care. Our goal is always to provide you with excellent care. Hearing back from our patients is one way we can continue to improve our services. Please take a few minutes to complete the written survey that you may receive in the mail after your visit with us. Thank you!             Your " Updated Medication List - Protect others around you: Learn how to safely use, store and throw away your medicines at www.disposemymeds.org.          This list is accurate as of 18  8:19 AM.  Always use your most recent med list.                   Brand Name Dispense Instructions for use Diagnosis    acetaminophen 325 MG tablet    TYLENOL    100 tablet    Take 2 tablets (650 mg) by mouth every 4 hours as needed for other (surgical pain)    S/P repeat low transverse        ibuprofen 400 MG tablet    ADVIL/MOTRIN    120 tablet    Take 1-2 tablets (400-800 mg) by mouth every 6 hours as needed for other (cramping)    S/P repeat low transverse

## 2018-08-13 ENCOUNTER — OFFICE VISIT (OUTPATIENT)
Dept: INTERNAL MEDICINE | Facility: CLINIC | Age: 38
End: 2018-08-13
Payer: COMMERCIAL

## 2018-08-13 VITALS
TEMPERATURE: 98 F | WEIGHT: 202.4 LBS | BODY MASS INDEX: 32.53 KG/M2 | SYSTOLIC BLOOD PRESSURE: 104 MMHG | OXYGEN SATURATION: 99 % | HEART RATE: 91 BPM | HEIGHT: 66 IN | RESPIRATION RATE: 16 BRPM | DIASTOLIC BLOOD PRESSURE: 68 MMHG

## 2018-08-13 DIAGNOSIS — M79.622 PAIN OF LEFT UPPER ARM: ICD-10-CM

## 2018-08-13 DIAGNOSIS — Z00.00 ENCOUNTER FOR ROUTINE ADULT HEALTH EXAMINATION WITHOUT ABNORMAL FINDINGS: Primary | ICD-10-CM

## 2018-08-13 PROCEDURE — G0476 HPV COMBO ASSAY CA SCREEN: HCPCS | Performed by: INTERNAL MEDICINE

## 2018-08-13 PROCEDURE — 99395 PREV VISIT EST AGE 18-39: CPT | Performed by: INTERNAL MEDICINE

## 2018-08-13 PROCEDURE — G0145 SCR C/V CYTO,THINLAYER,RESCR: HCPCS | Performed by: INTERNAL MEDICINE

## 2018-08-13 NOTE — LETTER
August 27, 2018    Maggy Adams  [address] [facility] [address]    Dear Maggy,  We are happy to inform you that your PAP smear result from 08/13/18 is normal.  We are now able to do a follow up test on PAP smears. The DNA test is for HPV (Human Papilloma Virus). Cervical cancer is closely linked with certain types of HPV. Your results showed no evidence of high risk HPV.  Therefore we recommend you return in 3 years for your next pap smear.  You will still need to return to the clinic every year for an annual exam and other preventive tests.  Please contact the clinic at [phone] with any questions.  Sincerely,    Carlos A Galan MD/[facility]

## 2018-08-13 NOTE — MR AVS SNAPSHOT
After Visit Summary   8/13/2018    Maggy Adams    MRN: [id number]           Patient Information     Date Of Birth          1980        Visit Information        Provider Department      8/13/2018 11:40 AM Carlos A Galan MD [facility]        Today's Diagnoses     Encounter for routine adult health examination without abnormal findings    -  1    Pain of left upper arm          Care Instructions      Preventive Health Recommendations  Female Ages 26 - 39  Yearly exam:   See your health care provider every year in order to    Review health changes.     Discuss preventive care.      Review your medicines if you your doctor has prescribed any.    Until age 30: Get a Pap test every three years (more often if you have had an abnormal result).    After age 30: Talk to your doctor about whether you should have a Pap test every 3 years or have a Pap test with HPV screening every 5 years.   You do not need a Pap test if your uterus was removed (hysterectomy) and you have not had cancer.  You should be tested each year for STDs (sexually transmitted diseases), if you're at risk.   Talk to your provider about how often to have your cholesterol checked.  If you are at risk for diabetes, you should have a diabetes test (fasting glucose).  Shots: Get a flu shot each year. Get a tetanus shot every 10 years.   Nutrition:     Eat at least 5 servings of fruits and vegetables each day.    Eat whole-grain bread, whole-wheat pasta and brown rice instead of white grains and rice.    Get adequate Calcium and Vitamin D.     Lifestyle    Exercise at least 150 minutes a week (30 minutes a day, 5 days of the week). This will help you control your weight and prevent disease.    Limit alcohol to one drink per day.    No smoking.     Wear sunscreen to prevent skin cancer.    See your dentist every six months for an exam and cleaning.            Follow-ups after your visit        Additional  Services     ORTHO  REFERRAL       [facility] is referring you to the Orthopedic  Services at [facility].       The  Representative will assist you in the coordination of your Orthopedic and Musculoskeletal Care as prescribed by your physician.    The  Representative will call you within 1 business day to help schedule your appointment, or you may contact the  Representative at:    All areas ~ [phone]     Type of Referral : Non Surgical       Timeframe requested: Routine    Coverage of these services is subject to the terms and limitations of your health insurance plan.  Please call member services at your health plan with any benefit or coverage questions.      If X-rays, CT or MRI's have been performed, please contact the facility where they were done to arrange for , prior to your scheduled appointment.  Please bring this referral request to your appointment and present it to your specialist.                  Who to contact     If you have questions or need follow up information about today's clinic visit or your schedule please contact [facility] directly at [phone].  Normal or non-critical lab and imaging results will be communicated to you by MyChart, letter or phone within 4 business days after the clinic has received the results. If you do not hear from us within 7 days, please contact the clinic through MyChart or phone. If you have a critical or abnormal lab result, we will notify you by phone as soon as possible.  Submit refill requests through [employer] or call your pharmacy and they will forward the refill request to us. Please allow 3 business days for your refill to be completed.          Additional Information About Your Visit        [employer] Information     [employer] lets you send messages to your doctor, view your test results, renew your prescriptions, schedule appointments and more.  "To sign up, go to [email or web address] . Click on \"Log in\" on the left side of the screen, which will take you to the Welcome page. Then click on \"Sign up Now\" on the right side of the page.     You will be asked to enter the access code listed below, as well as some personal information. Please follow the directions to create your username and password.     Your access code is: [email or web address]  Expires: 2018  8:19 AM     Your access code will  in 90 days. If you need help or a new code, please call your [place] clinic or [phone].        Care EveryWhere ID     This is your Care EveryWhere ID. This could be used by other organizations to access your [place] medical records  [id number]        Your Vitals Were     Pulse Temperature Respirations Height Last Period Pulse Oximetry    91 98  F (36.7  C) (Oral) 16 5' 5.5\" (1.664 m) 2018 (Approximate) 99%    BMI (Body Mass Index)                   33.17 kg/m2            Blood Pressure from Last 3 Encounters:   18 104/68   18 102/62   17 106/68    Weight from Last 3 Encounters:   18 202 lb 6.4 oz (91.8 kg)   18 203 lb (92.1 kg)              We Performed the Following     ORTHO  REFERRAL        Primary Care Provider Office Phone # Fax #    Cici SHALOM Escobar [phone]       [facility] [address]  [place] [address]        Equal Access to Services     [facility]: Hadii aad ku hadasho Soomaali, waaxda luqadaha, qaybta kaalmada josafat, rosmery blanc. So [facility] [phone].    ATENCIÓN: Si habla español, tiene a siegel disposición servicios gratuitos de asistencia lingüística. Samina al [phone].    We comply with applicable federal civil rights laws and [place] laws. We do not discriminate on the basis of race, color, national origin, age, disability, sex, sexual orientation, or gender identity.            Thank you!     Thank you for choosing " [facility]  for your care. Our goal is always to provide you with excellent care. Hearing back from our patients is one way we can continue to improve our services. Please take a few minutes to complete the written survey that you may receive in the mail after your visit with us. Thank you!             Your Updated Medication List - Protect others around you: Learn how to safely use, store and throw away your medicines at www.disposemymeds.org.          This list is accurate as of 18 12:28 PM.  Always use your most recent med list.                   Brand Name Dispense Instructions for use Diagnosis    acetaminophen 325 MG tablet    TYLENOL    100 tablet    Take 2 tablets (650 mg) by mouth every 4 hours as needed for other (surgical pain)    S/P repeat low transverse        ibuprofen 400 MG tablet    ADVIL/MOTRIN    120 tablet    Take 1-2 tablets (400-800 mg) by mouth every 6 hours as needed for other (cramping)    S/P repeat low transverse

## 2018-08-13 NOTE — PROGRESS NOTES
SUBJECTIVE:   CC: Maggy Adams is an 38 year old woman who presents for preventive health visit.     Healthy Habits:    Do you get at least three servings of calcium containing foods daily (dairy, green leafy vegetables, etc.)? yes    Amount of exercise or daily activities, outside of work: 6 day(s) per week    Problems taking medications regularly No    Medication side effects: No    Have you had an eye exam in the past two years? no    Do you see a dentist twice per year? yes    Do you have sleep apnea, excessive snoring or daytime drowsiness?no    Pt c/o pain in lt Biceps area since few yrs, pt says sh has had injury when she was young and in the last few yrs has noticed pain in lt biceps area and some times cannot lift gallon of milk,      Today's PHQ-2 Score:   PHQ-2 (  [employer]) 2018   Q1: Little interest or pleasure in doing things 0   Q2: Feeling down, depressed or hopeless 0   PHQ-2 Score 0       Abuse: Current or Past(Physical, Sexual or Emotional)- No  Do you feel safe in your environment - Yes      Past Medical History:   Diagnosis Date     Diabetes (H)     gestational diabetic     History of blood transfusion     at 6 months old       Past Surgical History:   Procedure Laterality Date      SECTION        SECTION        SECTION, TUBAL LIGATION, COMBINED N/A 12/15/2017    Procedure: COMBINED  SECTION, TUBAL LIGATION;  Repeat  section with bilateral tubal ligation;  Surgeon: Tonya Navarro MD;  Location: [facility]       Current Outpatient Prescriptions   Medication Sig Dispense Refill     acetaminophen (TYLENOL) 325 MG tablet Take 2 tablets (650 mg) by mouth every 4 hours as needed for other (surgical pain) (Patient not taking: Reported on 2018) 100 tablet      ibuprofen (ADVIL/MOTRIN) 400 MG tablet Take 1-2 tablets (400-800 mg) by mouth every 6 hours as needed for other (cramping) (Patient not taking: Reported on 2018) 120 tablet   "      History reviewed. No pertinent family history.    Social History   Substance Use Topics     Smoking status: Never Smoker     Smokeless tobacco: Never Used     Alcohol use No     If you drink alcohol do you typically have >3 drinks per day or >7 drinks per week? No                     Reviewed orders with patient.  Reviewed health maintenance and updated orders accordingly - Yes      Mammogram not appropriate for this patient based on age.    Pertinent mammograms are reviewed under the imaging tab.  History of abnormal Pap smear: NO - age 30- 65 PAP every 3 years recommended     Reviewed and updated as needed this visit by clinical staff  Tobacco  Allergies  Meds  Med Hx  Surg Hx  Fam Hx  Soc Hx        Reviewed and updated as needed this visit by Provider            ROS:  CONSTITUTIONAL: NEGATIVE for fever, chills, change in weight  INTEGUMENTARU/SKIN: NEGATIVE for worrisome rashes, moles or lesions  EYES: NEGATIVE for vision changes or irritation  ENT: NEGATIVE for ear, mouth and throat problems  RESP: NEGATIVE for significant cough or SOB  BREAST: NEGATIVE for masses, tenderness or discharge  CV: NEGATIVE for chest pain, palpitations or peripheral edema  GI: NEGATIVE for nausea, abdominal pain, heartburn, or change in bowel habits  : NEGATIVE for unusual urinary or vaginal symptoms. Periods are regular.  MUSCULOSKELETAL: NEGATIVE for significant arthralgias or myalgia  NEURO: NEGATIVE for weakness, dizziness or paresthesias  PSYCHIATRIC: NEGATIVE for changes in mood or affect    OBJECTIVE:   /68  Pulse 91  Temp 98  F (36.7  C) (Oral)  Resp 16  Ht 5' 2.75\" (1.594 m)  Wt 202 lb 6.4 oz (91.8 kg)  LMP 07/27/2018 (Approximate)  SpO2 99%  BMI 36.14 kg/m2  EXAM:  GENERAL: healthy, alert and no distress  EYES: Eyes grossly normal to inspection, PERRL and conjunctivae and sclerae normal  HENT: ear canals and TM's normal, nose and mouth without ulcers or lesions  NECK: no adenopathy, no asymmetry, " "masses, or scars and thyroid normal to palpation  RESP: lungs clear to auscultation - no rales, rhonchi or wheezes  BREAST: normal without masses, tenderness or nipple discharge and no palpable axillary masses or adenopathy  CV: regular rate and rhythm, normal S1 S2, no S3 or S4, no murmur, click or rub, no peripheral edema and peripheral pulses strong  ABDOMEN: soft, nontender, no hepatosplenomegaly, no masses and bowel sounds normal   (female): normal female external genitalia, normal urethral meatus, vaginal mucosa pink, moist, well rugated, and normal cervix/adnexa without masses or discharge, pap obtained   MS: tenderness lt deltoid area, muscle strength normal.  EXT:   no edema, no calf tenderness  NEURO: Normal strength and tone, mentation intact and speech normal  PSYCH: mentation appears normal, affect normal/bright      ASSESSMENT/PLAN:     (Z00.00) Encounter for routine adult health examination without abnormal findings  (primary encounter diagnosis)  Plan: recent lab results reviewed with pt, no diabetes, slightly low HDL, advised regular exercise,     (M79.622) Pain of left upper arm  Plan: ORTHO  REFERRAL          COUNSELING:   Reviewed preventive health counseling, as reflected in patient instructions       Regular exercise       Healthy diet/nutrition    BP Readings from Last 1 Encounters:   08/13/18 104/68     Estimated body mass index is 36.14 kg/(m^2) as calculated from the following:    Height as of this encounter: 5' 2.75\" (1.594 m).    Weight as of this encounter: 202 lb 6.4 oz (91.8 kg).      Weight management plan: Discussed healthy diet and exercise guidelines and patient will follow up in 12 months in clinic to re-evaluate.     reports that she has never smoked. She has never used smokeless tobacco.      Counseling Resources:  ATP IV Guidelines  Pooled Cohorts Equation Calculator  Breast Cancer Risk Calculator  FRAX Risk Assessment  ICSI Preventive Guidelines  Dietary Guidelines " for Americans, 2010  USDA's MyPlate  ASA Prophylaxis  Lung CA Screening    Carlos A Galan MD  [facility]

## 2018-08-13 NOTE — NURSING NOTE
"/68  Pulse 91  Temp 98  F (36.7  C) (Oral)  Resp 16  Ht 5' 2.75\" (1.594 m)  Wt 202 lb 6.4 oz (91.8 kg)  LMP 07/27/2018 (Approximate)  SpO2 99%  BMI 36.14 kg/m2  Patient in for annual physical.  Tarsha Hernandez CMA    "

## 2018-08-17 LAB
COPATH REPORT: NORMAL
PAP: NORMAL

## 2018-08-20 ENCOUNTER — OFFICE VISIT (OUTPATIENT)
Dept: ORTHOPEDICS | Facility: CLINIC | Age: 38
End: 2018-08-20
Payer: COMMERCIAL

## 2018-08-20 VITALS
SYSTOLIC BLOOD PRESSURE: 100 MMHG | WEIGHT: 202 LBS | DIASTOLIC BLOOD PRESSURE: 65 MMHG | BODY MASS INDEX: 32.47 KG/M2 | HEIGHT: 66 IN

## 2018-08-20 DIAGNOSIS — S46.012A STRAIN OF ROTATOR CUFF CAPSULE, LEFT, INITIAL ENCOUNTER: ICD-10-CM

## 2018-08-20 DIAGNOSIS — S13.9XXA CERVICAL SPRAIN, INITIAL ENCOUNTER: Primary | ICD-10-CM

## 2018-08-20 LAB
FINAL DIAGNOSIS: NORMAL
HPV HR 12 DNA CVX QL NAA+PROBE: NEGATIVE
HPV16 DNA SPEC QL NAA+PROBE: NEGATIVE
HPV18 DNA SPEC QL NAA+PROBE: NEGATIVE
SPECIMEN DESCRIPTION: NORMAL
SPECIMEN SOURCE CVX/VAG CYTO: NORMAL

## 2018-08-20 PROCEDURE — 99244 OFF/OP CNSLTJ NEW/EST MOD 40: CPT | Performed by: FAMILY MEDICINE

## 2018-08-20 RX ORDER — METAXALONE 800 MG/1
800 TABLET ORAL 2 TIMES DAILY
Qty: 60 TABLET | Refills: 1 | Status: SHIPPED | OUTPATIENT
Start: 2018-08-20

## 2018-08-20 RX ORDER — NAPROXEN 500 MG/1
500 TABLET ORAL 2 TIMES DAILY WITH MEALS
Qty: 60 TABLET | Refills: 1 | Status: SHIPPED | OUTPATIENT
Start: 2018-08-20

## 2018-08-20 NOTE — LETTER
8/20/2018         RE: Maggy Adams  [address] [address] [address]        Dear Colleague,    Thank you for referring your patient, Maggy Adams, to the [facility]. Please see a copy of my visit note below.    ASSESSMENT & PLAN  Patient Instructions     1. Cervical sprain, initial encounter    2. Strain of rotator cuff capsule, left, initial encounter      -Patient has left upper extremity strain and weakness due to a combination of neck and left shoulder muscle spasms and tightness   -Patient will start formal physical therapy and home exercise program  -Patient will continue to work as tolerated  -Patient will start naproxen and metaxalone as needed  -Patient will follow up in 4 weeks for reevaluation.  If patient has no improvement at the next visit to consider possible light duty at work for a period of time.  -Call direct clinic number [[phone]] at any time with questions or concerns.    Albert Yeo MD [facility]  [facility]        -Call direct clinic number [[phone]] at any time with questions or concerns.    Albert Yeo MD [facility]  [facility]          -----    SUBJECTIVE  Maggy Adams is a/an 38 year old Right handed female who is seen in consultation at the request of  Carlos A Galan M.D. for evaluation of left upper arm pain. The patient is seen by themselves.    Onset: 23 years(s) ago. Patient describes injury as a fall onto rocks when she was 15 years old. Patient states that pain has increased over the last couple months.  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    Past Medical History:   Diagnosis Date     Diabetes (H)     gestational diabetic     History of blood transfusion     at 6 months old     Social History     Social History     Marital status: Single     Spouse name: N/A     Number of children: N/A     Years of education: N/A     Social History Main Topics     Smoking status: Never Smoker     Smokeless tobacco: Never Used     Alcohol use No     Drug use: No     Sexual activity: Yes     Partners: Male     Birth control/ protection: Female Surgical     Other Topics Concern     Not on file     Social History Narrative         Patient's past medical, surgical, social, and family histories were reviewed today and no changes are noted.    REVIEW OF SYSTEMS:  10 point ROS is negative other than symptoms noted above in HPI, Past Medical History or as stated below  Constitutional: NEGATIVE for fever, chills, change in weight  Skin: NEGATIVE for worrisome rashes, moles or lesions  GI/: NEGATIVE for bowel or bladder changes  Neuro: NEGATIVE for weakness, dizziness or paresthesias    OBJECTIVE:  /65 (BP Location: Right arm, Patient Position: Sitting, Cuff Size: Adult Large)  Ht 5' 5.5\" (1.664 m)  Wt 202 lb (91.6 kg)  LMP 07/27/2018 (Approximate)  BMI 33.1 kg/m2   General: healthy, alert and in no distress  HEENT: no scleral icterus or conjunctival erythema  Skin: no suspicious lesions or rash. No jaundice.  CV: regular rhythm by palpation  Resp: normal respiratory effort without conversational dyspnea   Psych: normal mood and affect  Gait: normal steady gait with appropriate coordination and balance  Neuro: normal light touch sensory exam of the bilateral upper extremities.    MSK:  LEFT SHOULDER  Inspection:    no swelling, bruising, " discoloration, or obvious deformity or asymmetry  Palpation:    Tender about the upper trapezius region and rhomboids. Remainder of bony and tendinous landmarks are nontender.  Active Range of Motion:     Abduction normal0, FF normal0, ER normal0, IR normal.      Scapular dyskinesis absent  Strength:    Scapular plane abduction 5-/5,  ER 5/5, IR 5/5, biceps 5/5, triceps 5/5  Special Tests:    Positive: None    Negative: Neer's, Cochran', supraspinatus (empty can), Ute's, Speed's and Yergason's    CERVICAL SPINE  Inspection:    normal cervical lordosis present, rounded shoulders, forward head posture  Palpation:    Tender about the paracervical musculature (left), levator scapula (left), upper trapezius (left), lower trapezius (left) and rhomboids (left). Otherwise remainder of the landmarks and nontender.  Range of Motion:     Flexion within normal limits    Extension within normal limits    Right side bend within normal limits    Left side bend within normal limits    Right rotation within normal limits    Left rotation within normal limits  Strength:    Full strength throughout all neck muscles  Special Tests:    Positive: None    Negative: Spurling's (bilateral), Gonzalez's (bilateral)      Albert Yeo MD [facility]      Again, thank you for allowing me to participate in the care of your patient.        Sincerely,        Albert Yeo, MD

## 2018-08-20 NOTE — MR AVS SNAPSHOT
After Visit Summary   8/20/2018    Maggy Adams    MRN: [id number]           Patient Information     Date Of Birth          1980        Visit Information        Provider Department      8/20/2018 11:00 AM Yeo, Albert, MD [facility]        Today's Diagnoses     Cervical sprain, initial encounter    -  1    Strain of rotator cuff capsule, left, initial encounter          Care Instructions    1. Cervical sprain, initial encounter    2. Strain of rotator cuff capsule, left, initial encounter      -Patient has left upper extremity strain and weakness due to a combination of neck and left shoulder muscle spasms and tightness   -Patient will start formal physical therapy and home exercise program  -Patient will continue to work as tolerated  -Patient will start naproxen and metaxalone as needed  -Patient will follow up in 4 weeks for reevaluation.  If patient has no improvement at the next visit to consider possible light duty at work for a period of time.  -Call direct clinic number [[phone]] at any time with questions or concerns.    Albert Yeo MD [facility]        -Call direct clinic number [[phone]] at any time with questions or concerns.    Albert Yeo MD [facility]                Follow-ups after your visit        Additional Services     [facility] PT, HAND, AND CHIROPRACTIC REFERRAL       **This order will print in the [facility] Scheduling Office**    Physical Therapy, Hand Therapy and Chiropractic Care are available through:    *[facility]  *[facility]  *[facility]    Call one number to schedule at any of the above locations: [phone].    Your provider has referred you to: Physical Therapy at [facility] or [facility]    Indication/Reason for Referral: Neck Pain and Shoulder Pain  Onset of Illness:  "  Therapy Orders: Evaluate and Treat  Special Programs: None  Special Request: None    Nhan Justin      Additional Comments for the Therapist or Chiropractor:     Please be aware that coverage of these services is subject to the terms and limitations of your health insurance plan.  Call member services at your health plan with any benefit or coverage questions.      Please bring the following to your appointment:    *Your personal calendar for scheduling future appointments  *Comfortable clothing                  Who to contact     If you have questions or need follow up information about today's clinic visit or your schedule please contact [facility] directly at [phone].  Normal or non-critical lab and imaging results will be communicated to you by Miami2Vegashart, letter or phone within 4 business days after the clinic has received the results. If you do not hear from us within 7 days, please contact the clinic through OpSourcet or phone. If you have a critical or abnormal lab result, we will notify you by phone as soon as possible.  Submit refill requests through Magnetic or call your pharmacy and they will forward the refill request to us. Please allow 3 business days for your refill to be completed.          Additional Information About Your Visit        MyChart Information     Magnetic lets you send messages to your doctor, view your test results, renew your prescriptions, schedule appointments and more. To sign up, go to [email or web address] . Click on \"Log in\" on the left side of the screen, which will take you to the Welcome page. Then click on \"Sign up Now\" on the right side of the page.     You will be asked to enter the access code listed below, as well as some personal information. Please follow the directions to create your username and password.     Your access code is: [email or web address]  Expires: 2018  8:19 AM     Your access code will  in 90 days. If you need help or a new " "code, please call your [facility] clinic or [phone].        Care EveryWhere ID     This is your Care EveryWhere ID. This could be used by other organizations to access your [facility] medical records  [id number]        Your Vitals Were     Height Last Period BMI (Body Mass Index)             5' 5.5\" (1.664 m) 07/27/2018 (Approximate) 33.1 kg/m2          Blood Pressure from Last 3 Encounters:   08/20/18 100/65   08/13/18 104/68   08/06/18 102/62    Weight from Last 3 Encounters:   08/20/18 202 lb (91.6 kg)   08/13/18 202 lb 6.4 oz (91.8 kg)   08/06/18 203 lb (92.1 kg)              We Performed the Following     [facility] PT, HAND, AND CHIROPRACTIC REFERRAL          Today's Medication Changes          These changes are accurate as of 8/20/18 11:39 AM.  If you have any questions, ask your nurse or doctor.               Start taking these medicines.        Dose/Directions    metaxalone 800 MG tablet   Commonly known as:  SKELAXIN   Used for:  Cervical sprain, initial encounter   Started by:  Yeo, Albert, MD        Dose:  800 mg   Take 1 tablet (800 mg) by mouth 2 times daily   Quantity:  60 tablet   Refills:  1       naproxen 500 MG tablet   Commonly known as:  NAPROSYN   Used for:  Cervical sprain, initial encounter   Started by:  Yeo, Albert, MD        Dose:  500 mg   Take 1 tablet (500 mg) by mouth 2 times daily (with meals)   Quantity:  60 tablet   Refills:  1            Where to get your medicines      These medications were sent to [facility] [address] [address]     Phone:  [phone]     metaxalone 800 MG tablet    naproxen 500 MG tablet                Primary Care Provider Office Phone # Fax #    Cici Escobar [phone]       [facility] [address] [facility] [address]  [facility] [address]        Equal Access to Services     OCHOA PATEL AH: Hadii ayleen Mercado, waaxda lurajiv, qaybta mera maurice, " rosmery sánchezjohana lee'aan ah. So [facility] [phone].    ATENCIÓN: Si christella dash, tiene a siegel disposición servicios gratuitos de asistencia lingüística. Samina guillen [phone].    We comply with applicable federal civil rights laws and [place] laws. We do not discriminate on the basis of race, color, national origin, age, disability, sex, sexual orientation, or gender identity.            Thank you!     Thank you for choosing [facility]  for your care. Our goal is always to provide you with excellent care. Hearing back from our patients is one way we can continue to improve our services. Please take a few minutes to complete the written survey that you may receive in the mail after your visit with us. Thank you!             Your Updated Medication List - Protect others around you: Learn how to safely use, store and throw away your medicines at www.disposemymeds.org.          This list is accurate as of 18 11:39 AM.  Always use your most recent med list.                   Brand Name Dispense Instructions for use Diagnosis    acetaminophen 325 MG tablet    TYLENOL    100 tablet    Take 2 tablets (650 mg) by mouth every 4 hours as needed for other (surgical pain)    S/P repeat low transverse        ibuprofen 400 MG tablet    ADVIL/MOTRIN    120 tablet    Take 1-2 tablets (400-800 mg) by mouth every 6 hours as needed for other (cramping)    S/P repeat low transverse        metaxalone 800 MG tablet    SKELAXIN    60 tablet    Take 1 tablet (800 mg) by mouth 2 times daily    Cervical sprain, initial encounter       naproxen 500 MG tablet    NAPROSYN    60 tablet    Take 1 tablet (500 mg) by mouth 2 times daily (with meals)    Cervical sprain, initial encounter

## 2018-08-20 NOTE — PATIENT INSTRUCTIONS
1. Cervical sprain, initial encounter    2. Strain of rotator cuff capsule, left, initial encounter      -Patient has left upper extremity strain and weakness due to a combination of neck and left shoulder muscle spasms and tightness   -Patient will start formal physical therapy and home exercise program  -Patient will continue to work as tolerated  -Patient will start naproxen and metaxalone as needed  -Patient will follow up in 4 weeks for reevaluation.  If patient has no improvement at the next visit to consider possible light duty at work for a period of time.  -Call direct clinic number [[phone]] at any time with questions or concerns.    Albert Yeo MD [facility] and [facility] Medicine  [facility]        -Call direct clinic number [[phone]] at any time with questions or concerns.    Albert Yeo MD [facility]  [facility]

## 2018-08-20 NOTE — PROGRESS NOTES
ASSESSMENT & PLAN  Patient Instructions     1. Cervical sprain, initial encounter    2. Strain of rotator cuff capsule, left, initial encounter      -Patient has left upper extremity strain and weakness due to a combination of neck and left shoulder muscle spasms and tightness   -Patient will start formal physical therapy and home exercise program  -Patient will continue to work as tolerated  -Patient will start naproxen and metaxalone as needed  -Patient will follow up in 4 weeks for reevaluation.  If patient has no improvement at the next visit to consider possible light duty at work for a period of time.  -Call direct clinic number [[phone]] at any time with questions or concerns.    Albert Yeo MD [facility]        -Call direct clinic number [[phone]] at any time with questions or concerns.    Albert Yeo MD [facility]          -----    SUBJECTIVE  Maggy Adams is a/an 38 year old Right handed female who is seen in consultation at the request of  Carlos A Galan M.D. for evaluation of left upper arm pain. The patient is seen by themselves.    Onset: 23 years(s) ago. Patient describes injury as a fall onto rocks when she was 15 years old. Patient states that pain has increased over the last couple months.  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    Past Medical History:  "  Diagnosis Date     Diabetes (H)     gestational diabetic     History of blood transfusion     at 6 months old     Social History     Social History     Marital status: Single     Spouse name: N/A     Number of children: N/A     Years of education: N/A     Social History Main Topics     Smoking status: Never Smoker     Smokeless tobacco: Never Used     Alcohol use No     Drug use: No     Sexual activity: Yes     Partners: Male     Birth control/ protection: Female Surgical     Other Topics Concern     Not on file     Social History Narrative         Patient's past medical, surgical, social, and family histories were reviewed today and no changes are noted.    REVIEW OF SYSTEMS:  10 point ROS is negative other than symptoms noted above in HPI, Past Medical History or as stated below  Constitutional: NEGATIVE for fever, chills, change in weight  Skin: NEGATIVE for worrisome rashes, moles or lesions  GI/: NEGATIVE for bowel or bladder changes  Neuro: NEGATIVE for weakness, dizziness or paresthesias    OBJECTIVE:  /65 (BP Location: Right arm, Patient Position: Sitting, Cuff Size: Adult Large)  Ht 5' 5.5\" (1.664 m)  Wt 202 lb (91.6 kg)  LMP 07/27/2018 (Approximate)  BMI 33.1 kg/m2   General: healthy, alert and in no distress  HEENT: no scleral icterus or conjunctival erythema  Skin: no suspicious lesions or rash. No jaundice.  CV: regular rhythm by palpation  Resp: normal respiratory effort without conversational dyspnea   Psych: normal mood and affect  Gait: normal steady gait with appropriate coordination and balance  Neuro: normal light touch sensory exam of the bilateral upper extremities.    MSK:  LEFT SHOULDER  Inspection:    no swelling, bruising, discoloration, or obvious deformity or asymmetry  Palpation:    Tender about the upper trapezius region and rhomboids. Remainder of bony and tendinous landmarks are nontender.  Active Range of Motion:     Abduction normal0, FF normal0, ER normal0, IR normal. "      Scapular dyskinesis absent  Strength:    Scapular plane abduction 5-/5,  ER 5/5, IR 5/5, biceps 5/5, triceps 5/5  Special Tests:    Positive: None    Negative: Neer's, Cochran', supraspinatus (empty can), Stanfield's, Speed's and Yergason's    CERVICAL SPINE  Inspection:    normal cervical lordosis present, rounded shoulders, forward head posture  Palpation:    Tender about the paracervical musculature (left), levator scapula (left), upper trapezius (left), lower trapezius (left) and rhomboids (left). Otherwise remainder of the landmarks and nontender.  Range of Motion:     Flexion within normal limits    Extension within normal limits    Right side bend within normal limits    Left side bend within normal limits    Right rotation within normal limits    Left rotation within normal limits  Strength:    Full strength throughout all neck muscles  Special Tests:    Positive: None    Negative: Spurling's (bilateral), Gonzalez's (bilateral)      Albert Yeo MD [facility]

## 2018-08-23 ENCOUNTER — THERAPY VISIT (OUTPATIENT)
Dept: PHYSICAL THERAPY | Facility: CLINIC | Age: 38
End: 2018-08-23
Payer: COMMERCIAL

## 2018-08-23 DIAGNOSIS — M54.12 CERVICAL RADICULOPATHY: ICD-10-CM

## 2018-08-23 PROCEDURE — G8984 CARRY CURRENT STATUS: HCPCS | Mod: GP | Performed by: PHYSICAL THERAPIST

## 2018-08-23 PROCEDURE — G8985 CARRY GOAL STATUS: HCPCS | Mod: GP | Performed by: PHYSICAL THERAPIST

## 2018-08-23 PROCEDURE — 97110 THERAPEUTIC EXERCISES: CPT | Mod: GP | Performed by: PHYSICAL THERAPIST

## 2018-08-23 PROCEDURE — 97161 PT EVAL LOW COMPLEX 20 MIN: CPT | Mod: GP | Performed by: PHYSICAL THERAPIST

## 2018-08-23 PROCEDURE — 97112 NEUROMUSCULAR REEDUCATION: CPT | Mod: GP | Performed by: PHYSICAL THERAPIST

## 2018-08-23 NOTE — MR AVS SNAPSHOT
After Visit Summary   8/23/2018    Maggy Adams    MRN: [id number]           Patient Information     Date Of Birth          1980        Visit Information        Provider Department      8/23/2018 9:40 AM Paty Mcneill, PT [facility]        Today's Diagnoses     Cervical radiculopathy           Follow-ups after your visit        Your next 10 appointments already scheduled     Aug 27, 2018  7:15 AM CDT   [facility] Spine with Paty Mcneill PT   [facility] ([facility] [place]  )    [address] [facility]  [address] [place] [address]   [phone]            Sep 06, 2018  2:50 PM CDT   [facility] Spine with Paty Mcneill, PT   [facility] ([facility] [place]  )    [address] [address] [address]   [phone]            Sep 24, 2018 11:00 AM CDT   Return Visit with Albert Yeo, MD   [facility] ([place] [facility] [place])    [address] [address] [address]   [phone]              Who to contact     If you have questions or need follow up information about today's clinic visit or your schedule please contact [facility] directly at [phone].  Normal or non-critical lab and imaging results will be communicated to you by 1DocWayhart, letter or phone within 4 business days after the clinic has received the results. If you do not hear from us within 7 days, please contact the clinic through 1DocWayhart or phone. If you have a critical or abnormal lab result, we will notify you by phone as soon as possible.  Submit refill requests through [employer] or call your pharmacy and they will forward the refill request to us. Please allow 3 business days for your refill to be completed.          Additional Information About Your Visit        [employer] Information     [employer] lets you send messages to your doctor, view your test results, renew your prescriptions, schedule appointments and more. To sign up, go to [email or web address]  ". Click on \"Log in\" on the left side of the screen, which will take you to the Welcome page. Then click on \"Sign up Now\" on the right side of the page.     You will be asked to enter the access code listed below, as well as some personal information. Please follow the directions to create your username and password.     Your access code is: [email or web address]  Expires: 2018  8:19 AM     Your access code will  in 90 days. If you need help or a new code, please call your [place] clinic or [phone].        Care EveryWhere ID     This is your Care EveryWhere ID. This could be used by other organizations to access your [place] medical records  [id number]        Your Vitals Were     Last Period                   2018 (Approximate)            Blood Pressure from Last 3 Encounters:   18 100/65   18 104/68   18 102/62    Weight from Last 3 Encounters:   18 91.6 kg (202 lb)   18 91.8 kg (202 lb 6.4 oz)   18 92.1 kg (203 lb)              We Performed the Following     HC PT EVAL, LOW COMPLEXITY     [facility] INITIAL EVAL REPORT     NEUROMUSCULAR RE-EDUCATION     THERAPEUTIC EXERCISES        Primary Care Provider Office Phone # Fax #    Cici SHALOM Escobar [phone]       [facility] [address]  [place] [address]        Equal Access to Services     [facility] AH: Hadii aad ku hadasho Soankit, waaxda luqadaha, qaybta kaalmada adeegyada, rosmery roth . So [facility] [phone].    ATENCIÓN: Si habla español, tiene a siegel disposición servicios gratuitos de asistencia lingüística. Llame al [phone].    We comply with applicable federal civil rights laws and [place] laws. We do not discriminate on the basis of race, color, national origin, age, disability, sex, sexual orientation, or gender identity.            Thank you!     Thank you for choosing [facility]  for your care. Our goal is always to provide you with " excellent care. Hearing back from our patients is one way we can continue to improve our services. Please take a few minutes to complete the written survey that you may receive in the mail after your visit with us. Thank you!             Your Updated Medication List - Protect others around you: Learn how to safely use, store and throw away your medicines at www.disposemymeds.org.          This list is accurate as of 18 11:11 AM.  Always use your most recent med list.                   Brand Name Dispense Instructions for use Diagnosis    acetaminophen 325 MG tablet    TYLENOL    100 tablet    Take 2 tablets (650 mg) by mouth every 4 hours as needed for other (surgical pain)    S/P repeat low transverse        ibuprofen 400 MG tablet    ADVIL/MOTRIN    120 tablet    Take 1-2 tablets (400-800 mg) by mouth every 6 hours as needed for other (cramping)    S/P repeat low transverse        metaxalone 800 MG tablet    SKELAXIN    60 tablet    Take 1 tablet (800 mg) by mouth 2 times daily    Cervical sprain, initial encounter       naproxen 500 MG tablet    NAPROSYN    60 tablet    Take 1 tablet (500 mg) by mouth 2 times daily (with meals)    Cervical sprain, initial encounter

## 2018-08-23 NOTE — PROGRESS NOTES
"[facility] Initial Evaluation -- Cervical    Evaluation Date: August 23, 2018  Magyg Adams is a 38 year old female with a cervica/L shoulder condition.   Referral: Dr. Yeo  Work mechanical stresses: working at [employer], repetitive/lifting   Employment status: part time (20 hrs/week)  Leisure mechanical stresses: not currently  Functional disability score (NDI):  See flow sheet  VAS score (0-10): 4/10  Patient goals/expectations:  \"to get rid of the pain\"    HISTORY:    Present symptoms:  L shoulder/back \"pressure\", feels like arm is going to \"fall down\".  Pain quality (sharp/shooting/stabbing/aching/burning/cramping):   aching.  Paresthesia (yes/no):  Yes, L hand tingling    Present since (onset date): May 2018.     Symptoms (improving/unchanging/worsening):  unchanging.    Symptoms commenced as a result of: beginning work at [employer]; has past history of shoulder injury 1995   Condition occurred in the following environment:  work    Symptoms at onset (neck/arm/forearm/headache): shoulder blade/neck;   Constant symptoms (neck/arm/forearm/headache): shoulder blade/neck  Intermittent symptoms (neck/arm/forearm/headache): post upper arm    Symptoms are made worse with the following: time of day - No effect and lifting/carrying   Symptoms are made better with the following: rest    Disturbed sleep (yes/no): no  Number of pillows: 1  Sleeping postures (prone/sup/side R/L): R side    Previous episodes (0/1-5/6-10/11+): has history of weakness in L UE  Year of first episode: 1995    Previous history: fell onto rocks when she was 15 injuring her L arm  Previous treatments: no treatment    Specific Questions: (as reported by the patient)  Dizziness/Tinnitus/Nausea/Swallowing (pos/neg): neg  Gait/Upper Limbs (normal/abnormal): abnormal  Medications (nil/NSAIDS/anlag/steroids/anticoag/other):  None  Medical allergies:  none  General health (excellent/good/fair/poor):  excellent  Pertinent medical " history:  History of 3 Csections  Imaging (None/Xray/MRI/Other):  none  Recent or major surgery (yes/no): no  Night pain (yes/no): no  Accidents (yes/no): no  Unexplained weight loss (yes/no): no  Barriers at home: no  Other red flags: no    EXAMINATION    Posture:   Sitting (good/fair/poor): fair  Standing (good/fair/poor): fair     Protruded head (yes/no): yes    Wry Neck (right/left/nil):  nil  Relevant (yes/no):  no     Correction of posture(better/worse/no effect): better  Other observations:      Neurological:    Motor Deficit:  C6=4+/5   Reflexes:  n/a  Sensory Deficit:  intact   Dural signs:  +ULNTT, mild    Movement Loss:   Yoel Mod Min Nil Pain   Protrusion    x    Flexion    x    Retraction    x ep   Extension    x    Lateral flexion R    x    Lateral flexion L    x erp   Rotation R    x erp   Rotation L    x erp     Test Movements:   During: produces, abolishes, increases, decreases, no effect, centralizing, peripheralizing  After: better, worse, no better, no worse, no effect, centralized, peripheralized    Pretest symptoms sitting: L scap, L post upper arm   Symptoms During Symptoms After ROM increased ROM decreased No Effect   PRO        Rep PRO        RET Increases    No Worse         Rep RET Increases    Worse      x   RET EXT Increases    No Worse         Rep RET EXT Increases    Worse      x   Pretest symptoms lying:     Symptoms During Symptoms After ROM increased ROM decreased No Effect   RET           Rep RET           RET EXT              Rep RET EXT              If required, pretest symptoms sitting:  L scap, L upper post arm    Symptoms During Symptoms After ROM increased ROM decreased No Effect   LF-R        Rep LF-R        LF-L Decreases    Better         Rep LF-L Decreases    Centralized    X, improved L C6=5/5, abolished L ULNTT     ROT-R        Rep ROT-R        ROT-L        Rep ROT-L        FLEX        Rep FLEX            Static Tests:   Protrusion:    Flexion:    Retraction:     Extension (sitting/prone/supine):      Other Tests:     Provisional Classification:  Derangement - Asymmetrical, unilateral, symptoms above elbow    Principle of Management:  Education:  Posture, DP lateral (side bending), sleeping posture    Equipment provided:  Suggested lumbar roll  Mechanical therapy (Y/N):  Y   Extension principle:     Lateral principle:  Rep L SB + pt op/x 10 every 2 hrs  Flexion principle:     Other:      ASSESSMENT/PLAN:    Patient is a 38 year old female with cervical complaints.    Patient has the following significant findings with corresponding treatment plan.                Diagnosis 1:  Cervical radiuclopathy  Pain -  manual therapy, self management, education, directional preference exercise and home program  Decreased ROM/flexibility - manual therapy and therapeutic exercise  Decreased strength - therapeutic exercise and therapeutic activities  Decreased function - therapeutic activities  Impaired posture - neuro re-education    Therapy Evaluation Codes:   1) History comprised of:   Personal factors that impact the plan of care:      None.    Comorbidity factors that impact the plan of care are:      None.     Medications impacting care: None.  2) Examination of Body Systems comprised of:   Body structures and functions that impact the plan of care:      Cervical spine.   Activity limitations that impact the plan of care are:      Lifting.  3) Clinical presentation characteristics are:   Stable/Uncomplicated.  4) Decision-Making    Low complexity using standardized patient assessment instrument and/or measureable assessment of functional outcome.  Cumulative Therapy Evaluation is: Low complexity.    Previous and current functional limitations:  (See Goal Flow Sheet for this information)    Short term and Long term goals: (See Goal Flow Sheet for this information)     Communication ability:  Patient appears to be able to clearly communicate and understand verbal and written  communication and follow directions correctly.  Treatment Explanation - The following has been discussed with the patient:   RX ordered/plan of care  Anticipated outcomes  Possible risks and side effects  This patient would benefit from PT intervention to resume normal activities.   Rehab potential is good.    Frequency:  1 X week, once daily  Duration:  for 4 weeks  Discharge Plan:  Achieve all LTG.  Independent in home treatment program.  Reach maximal therapeutic benefit.    Please refer to the daily flowsheet for treatment today, total treatment time and time spent performing 1:1 timed codes.

## 2018-08-27 ENCOUNTER — THERAPY VISIT (OUTPATIENT)
Dept: PHYSICAL THERAPY | Facility: CLINIC | Age: 38
End: 2018-08-27
Payer: COMMERCIAL

## 2018-08-27 DIAGNOSIS — M54.12 CERVICAL RADICULOPATHY: Primary | ICD-10-CM

## 2018-08-27 PROCEDURE — 97110 THERAPEUTIC EXERCISES: CPT | Mod: GP | Performed by: PHYSICAL THERAPIST

## 2018-08-27 PROCEDURE — 97112 NEUROMUSCULAR REEDUCATION: CPT | Mod: GP | Performed by: PHYSICAL THERAPIST

## 2019-08-16 ENCOUNTER — HOSPITAL ENCOUNTER (EMERGENCY)
Facility: CLINIC | Age: 39
Discharge: HOME | End: 2019-08-17
Attending: EMERGENCY MEDICINE | Admitting: EMERGENCY MEDICINE
Payer: COMMERCIAL

## 2019-08-16 DIAGNOSIS — N76.0 BV (BACTERIAL VAGINOSIS): ICD-10-CM

## 2019-08-16 DIAGNOSIS — B96.89 BV (BACTERIAL VAGINOSIS): ICD-10-CM

## 2019-08-16 LAB
ALBUMIN UR-MCNC: 10 MG/DL
APPEARANCE UR: CLEAR
BACTERIA #/AREA URNS HPF: ABNORMAL /HPF
BILIRUB UR QL STRIP: NEGATIVE
COLOR UR AUTO: YELLOW
GLUCOSE UR STRIP-MCNC: NEGATIVE MG/DL
HGB UR QL STRIP: ABNORMAL
KETONES UR STRIP-MCNC: NEGATIVE MG/DL
LEUKOCYTE ESTERASE UR QL STRIP: NEGATIVE
MUCOUS THREADS #/AREA URNS LPF: PRESENT /LPF
NITRATE UR QL: NEGATIVE
PH UR STRIP: 5.5 PH (ref 5–7)
RBC #/AREA URNS AUTO: 5 /HPF (ref 0–2)
SOURCE: ABNORMAL
SP GR UR STRIP: 1.03 (ref 1–1.03)
SQUAMOUS #/AREA URNS AUTO: 3 /HPF (ref 0–1)
UROBILINOGEN UR STRIP-MCNC: NORMAL MG/DL (ref 0–2)
WBC #/AREA URNS AUTO: 3 /HPF (ref 0–5)

## 2019-08-16 PROCEDURE — 99283 EMERGENCY DEPT VISIT LOW MDM: CPT

## 2019-08-16 PROCEDURE — 81001 URINALYSIS AUTO W/SCOPE: CPT | Performed by: EMERGENCY MEDICINE

## 2019-08-16 NOTE — ED AVS SNAPSHOT
[facility] Emergency Department  [address]  [facility] [address]  Phone:  [phone]  Fax:  [phone]                                    Maggy Adams   MRN: [id number]    Department:  [facility] Emergency Department   Date of Visit:  8/16/2019           After Visit Summary Signature Page    I have received my discharge instructions, and my questions have been answered. I have discussed any challenges I see with this plan with the nurse or doctor.    ..........................................................................................................................................  Patient/Patient Representative Signature      ..........................................................................................................................................  Patient Representative Print Name and Relationship to Patient    ..................................................               ................................................  Date                                   Time    ..........................................................................................................................................  Reviewed by Signature/Title    ...................................................              ..............................................  Date                                               Time          22EPIC Rev 08/18

## 2019-08-17 VITALS
RESPIRATION RATE: 18 BRPM | OXYGEN SATURATION: 97 % | DIASTOLIC BLOOD PRESSURE: 74 MMHG | HEART RATE: 66 BPM | SYSTOLIC BLOOD PRESSURE: 112 MMHG | TEMPERATURE: 98.8 F

## 2019-08-17 LAB
SPECIMEN SOURCE: ABNORMAL
WET PREP SPEC: ABNORMAL

## 2019-08-17 PROCEDURE — 87210 SMEAR WET MOUNT SALINE/INK: CPT | Performed by: EMERGENCY MEDICINE

## 2019-08-17 RX ORDER — METRONIDAZOLE 500 MG/1
500 TABLET ORAL 2 TIMES DAILY
Qty: 14 TABLET | Refills: 0 | Status: SHIPPED | OUTPATIENT
Start: 2019-08-17 | End: 2019-08-24

## 2019-08-17 ASSESSMENT — ENCOUNTER SYMPTOMS
FREQUENCY: 1
DYSURIA: 1

## 2019-08-17 NOTE — ED TRIAGE NOTES
Patient presents with complaints of burning with urination  and frequency for the past week. No blood in urine or abdomen pain. ABC intact without need for intervention at this time.

## 2019-08-17 NOTE — ED PROVIDER NOTES
History     Chief Complaint:  Dysuria    HPI   Maggy Adams is a 39 year old female who presents with dysuria and urinary frequency for the past week. LMP two weeks ago. She has undergone a tubal ligation. Patient denies rash and vaginal discharge.     Allergies:  No known drug allergies.    Medications:    Metaxalone      Past Medical History:    Diabetes    Past Surgical History:     x 3  Tubal ligation    Family History:    History reviewed. No pertinent family history.    Social History:  Presents to the ED with her daughter.   Tobacco Use: Never  Alcohol Use: No  PCP: Cici Escobar  Marital Status:  Single [1]     Review of Systems   Genitourinary: Positive for dysuria and frequency. Negative for vaginal discharge.   Skin: Negative for rash.   All other systems reviewed and are negative.        Physical Exam   First Vitals:  BP: 104/73  Pulse: 68  Heart Rate: 68  Temp: 98.8  F (37.1  C)  Resp: 18  SpO2: 100 %      Physical Exam  General: Patient is alert and interactive when I enter the room  Head:  The scalp, face, and head appear normal  Eyes:  The pupils are equal, round, and reactive to light    Conjunctivae and sclerae are normal  ENT:    External acoustic canals are normal    The oropharynx is normal without erythema.     Uvula is in the midline  Neck:  Normal range of motion  CV:  Regular rate. S1/S2. No murmurs.   Resp:  Lungs are clear without wheezes or rales. No distress  GI:  Abdomen is soft, no rigidity, guarding, or rebound    No distension. No tenderness to palpation in any quadrant.     (Chaperoned by female tech): White vaginal discharge, no lesions.   MS:  Normal tone. Joints grossly normal without effusions.     No asymmetric leg swelling, calf or thigh tenderness.      Normal motor assessment of all extremities.  Skin:  No rash or lesions noted. Normal capillary refill noted  Neuro: Speech is normal and fluent. Face is symmetric.     Moving all extremities well.    Psych:  Awake. Alert.  Normal affect.  Appropriate interactions.  Lymph: No anterior cervical lymphadenopathy noted    Emergency Department Course     Laboratory:  UA: Clear yellow urine, trace blood, protein 10, RBC 5 (H), few bacteria, squamous epithelial 3 (H), mucous present, otherwise WNL  Wet Prep: No PMN's seen.  No Trichomonas seen.  Clue cells seen. No yeast seen.    Emergency Department Course:  The patient arrived in triage where vitals were measured and recorded.   The patient was then escorted back to the emergency department.   The patient's medical records were reviewed.  Nursing notes and vitals were reviewed.  2359: I performed an exam of the patient as documented above.  The above workup was undertaken.  0040: I rechecked the patient and discussed results.  Findings and plan explained to the Patient. Patient discharged home, status improved, with instructions regarding supportive care, medications, and reasons to return as well as the importance of close follow-up was reviewed. Patient was prescribed Flagyl.        Impression & Plan      Medical Decision Making:  Maggy Adams is a 39 year old female who presents for evaluation of dysuria. UA is negative for infection. On exam patient has whitish vaginal discharge with itching and without symptoms of fevers, or back pain.  Patient opted for pelvic exam and wet prep shows clue cells. Will start on Flagyl and follow up closely with primary.     Diagnosis:    ICD-10-CM    1. BV (bacterial vaginosis) N76.0     B96.89        Disposition:  Discharged to home.     Discharge Medications:  New Prescriptions    METRONIDAZOLE (FLAGYL) 500 MG TABLET    Take 1 tablet (500 mg) by mouth 2 times daily for 7 days         Corrina JANE, am serving as a scribe on 8/16/2019 at 11:59 PM to personally document services performed by Dr. Villalpando based on my observations and the provider's statements to me.   [facility] EMERGENCY DEPARTMENT        Margarito Villalpando MD  08/17/19 0125

## (undated) DEVICE — GLOVE PROTEXIS BLUE W/NEU-THERA 6.5  2D73EB65

## (undated) DEVICE — PAD CHUX UNDERPAD 30X36" P3036C

## (undated) DEVICE — SUCTION MITY VAC MUSHROOM CUP 10007LP

## (undated) DEVICE — LINEN FULL SHEET 5511

## (undated) DEVICE — PREP CHLORAPREP 26ML TINTED ORANGE  260815

## (undated) DEVICE — LINEN TOWEL PACK X10 5473

## (undated) DEVICE — SU MONOCRYL 0 CTX 36" Y398H

## (undated) DEVICE — DRSG TEGADERM 4X10" 1627

## (undated) DEVICE — STOCKING SLEEVE VASOPRESS COMPRESSION CALF MED 18" VP501M

## (undated) DEVICE — TRANSFER DEVICE BLOOD NDL HOLDER 364880

## (undated) DEVICE — CATH TRAY FOLEY SURESTEP 16FR DRAIN BAG STATOCK A899916

## (undated) DEVICE — PREP SKIN SCRUB TRAY 4461A

## (undated) DEVICE — SUCTION CANISTER MEDIVAC LINER 3000ML W/LID 65651-530

## (undated) DEVICE — LINEN DRAPE 54X72" 5467

## (undated) DEVICE — GLOVE PROTEXIS POWDER FREE SMT 7.0  2D72PT70X

## (undated) DEVICE — BAG CLEAR TRASH 1.3M 39X33" P4040C

## (undated) DEVICE — PACK C-SECTION LF PL15OTA83B

## (undated) DEVICE — SU PLAIN 0 FN-2 27" N864H

## (undated) DEVICE — LINEN BABY BLANKET 5434

## (undated) DEVICE — GLOVE PROTEXIS BLUE W/NEU-THERA 7.0  2D73EB70

## (undated) DEVICE — SOL WATER IRRIG 1000ML BOTTLE 2F7114

## (undated) DEVICE — LINEN HALF SHEET 5512

## (undated) DEVICE — GLOVE BIOGEL SKINSENSE PC SZ 7.0 31470

## (undated) DEVICE — SU VICRYL 0 CT-1 36" J346H

## (undated) DEVICE — PREP POVIDONE IODINE SOLUTION 10% 120ML

## (undated) DEVICE — BLADE CLIPPER SGL USE 9680

## (undated) DEVICE — STPL SKIN SUBCUTICULAR INSORB  2030

## (undated) DEVICE — CAP BABY PINK/BLUE IC-2

## (undated) DEVICE — SOL NACL 0.9% IRRIG 1000ML BOTTLE 2F7124

## (undated) DEVICE — ESU GROUND PAD ADULT W/CORD E7507

## (undated) RX ORDER — OXYTOCIN/0.9 % SODIUM CHLORIDE 30/500 ML
PLASTIC BAG, INJECTION (ML) INTRAVENOUS
Status: DISPENSED
Start: 2017-12-15

## (undated) RX ORDER — EPHEDRINE SULFATE 50 MG/ML
INJECTION, SOLUTION INTRAMUSCULAR; INTRAVENOUS; SUBCUTANEOUS
Status: DISPENSED
Start: 2017-12-15

## (undated) RX ORDER — MORPHINE SULFATE 1 MG/ML
INJECTION, SOLUTION EPIDURAL; INTRATHECAL; INTRAVENOUS
Status: DISPENSED
Start: 2017-12-15